# Patient Record
Sex: FEMALE | Race: WHITE | NOT HISPANIC OR LATINO | Employment: OTHER | URBAN - NONMETROPOLITAN AREA
[De-identification: names, ages, dates, MRNs, and addresses within clinical notes are randomized per-mention and may not be internally consistent; named-entity substitution may affect disease eponyms.]

---

## 2022-04-22 RX ORDER — ALENDRONATE SODIUM 70 MG/1
70 TABLET ORAL
Qty: 12 TABLET | Refills: 0 | Status: SHIPPED | OUTPATIENT
Start: 2022-04-22 | End: 2022-05-03 | Stop reason: SDUPTHER

## 2022-04-22 NOTE — TELEPHONE ENCOUNTER
Rx Refill Note  Requested Prescriptions     Pending Prescriptions Disp Refills   • alendronate (Fosamax) 70 MG tablet 12 tablet 0     Sig: Take 1 tablet by mouth Every 7 (Seven) Days for 90 days.      Last office visit with prescribing clinician: Visit date not found      Next office visit with prescribing clinician: 7/12/2022      {TIP  Is Refill Pharmacy correct?YES  Deja Garcia Rep  04/22/22, 12:17 EDT     PT IS OUT, Sunday IS HER DAY TO TAKE ONE.

## 2022-04-27 ENCOUNTER — TELEPHONE (OUTPATIENT)
Dept: FAMILY MEDICINE CLINIC | Facility: CLINIC | Age: 82
End: 2022-04-27

## 2022-04-29 RX ORDER — ALENDRONATE SODIUM 70 MG/1
TABLET ORAL
Qty: 12 TABLET | Refills: 0 | OUTPATIENT
Start: 2022-04-29

## 2022-05-02 RX ORDER — ALENDRONATE SODIUM 70 MG/1
TABLET ORAL
Qty: 12 TABLET | Refills: 0 | OUTPATIENT
Start: 2022-05-02

## 2022-05-02 NOTE — TELEPHONE ENCOUNTER
Incoming Refill Request      Medication requested (name and dose): ALENDRONATE 70MG    Pharmacy where request should be sent: [facility]    Additional details provided by patient: PT IS NO LONGER USING [facility]. SHE SWITCHED TO [facility] SO THIS MEDICATION NEEDS TO BE SENT TO [facility]. SHE SAID SHE HAD ONE REFILL LEFT ON HER PRESCRIPTION AND THAT THE PHARMACY WILL NOT REFILL IT. PT HAS AN APPT WITH DR CHAMBERLAIN TOMORROW AFTERNOON TO HOPEFULLY GET THIS FIGURED OUT. I SEE WHERE IT WAS ATTEMPTED TO BE SENT BUT IT HAS NO CONFIRMED RECEIPT BY PHARMACY. PT IS VERY UPSET THAT SHE HAS HAD TO GO THROUGH ALL THIS TROUBLE. PLEASE CALL PT AND LET HER KNOW IF THIS MEDICATION IS BEING SENT IN, THANK YOU.    Best call back number: [phone]    Does the patient have less than a 3 day supply:  [x] Yes  [] No    Deja YADAV Rep  05/02/22, 13:54 EDT

## 2022-05-03 PROBLEM — M85.80 OSTEOPENIA: Status: ACTIVE | Noted: 2022-05-03

## 2022-05-03 PROBLEM — I10 ESSENTIAL HYPERTENSION: Status: ACTIVE | Noted: 2022-05-03

## 2022-05-03 PROBLEM — I48.19 OTHER PERSISTENT ATRIAL FIBRILLATION: Status: ACTIVE | Noted: 2022-05-03

## 2022-05-03 PROBLEM — Z79.899 HIGH RISK MEDICATION USE: Status: ACTIVE | Noted: 2022-05-03

## 2022-05-03 RX ORDER — ALENDRONATE SODIUM 70 MG/1
70 TABLET ORAL
Qty: 12 TABLET | Refills: 0 | Status: SHIPPED | OUTPATIENT
Start: 2022-05-03 | End: 2022-08-01

## 2022-05-13 DIAGNOSIS — E55.9 VITAMIN D DEFICIENCY: ICD-10-CM

## 2022-05-13 DIAGNOSIS — I10 ESSENTIAL HYPERTENSION: Primary | ICD-10-CM

## 2022-05-13 DIAGNOSIS — Z79.899 HIGH RISK MEDICATION USE: ICD-10-CM

## 2022-07-05 ENCOUNTER — LAB (OUTPATIENT)
Dept: FAMILY MEDICINE CLINIC | Facility: CLINIC | Age: 82
End: 2022-07-05

## 2022-07-05 DIAGNOSIS — E55.9 VITAMIN D DEFICIENCY: ICD-10-CM

## 2022-07-05 DIAGNOSIS — Z79.899 HIGH RISK MEDICATION USE: ICD-10-CM

## 2022-07-05 DIAGNOSIS — I10 ESSENTIAL HYPERTENSION: ICD-10-CM

## 2022-07-05 DIAGNOSIS — Z11.59 ENCOUNTER FOR HCV SCREENING TEST FOR LOW RISK PATIENT: Primary | ICD-10-CM

## 2022-07-05 PROCEDURE — 36415 COLL VENOUS BLD VENIPUNCTURE: CPT | Performed by: FAMILY MEDICINE

## 2022-07-06 LAB
25(OH)D3+25(OH)D2 SERPL-MCNC: 110 NG/ML (ref 30–100)
ALBUMIN SERPL-MCNC: 4.7 G/DL (ref 3.6–4.6)
ALBUMIN/GLOB SERPL: 2.1 {RATIO} (ref 1.2–2.2)
ALP SERPL-CCNC: 76 IU/L (ref 44–121)
ALT SERPL-CCNC: 18 IU/L (ref 0–32)
AST SERPL-CCNC: 24 IU/L (ref 0–40)
BASOPHILS # BLD AUTO: 0 X10E3/UL (ref 0–0.2)
BASOPHILS NFR BLD AUTO: 1 %
BILIRUB SERPL-MCNC: 0.7 MG/DL (ref 0–1.2)
BUN SERPL-MCNC: 25 MG/DL (ref 8–27)
BUN/CREAT SERPL: 30 (ref 12–28)
CALCIUM SERPL-MCNC: 9.6 MG/DL (ref 8.7–10.3)
CHLORIDE SERPL-SCNC: 103 MMOL/L (ref 96–106)
CHOLEST SERPL-MCNC: 204 MG/DL (ref 100–199)
CK SERPL-CCNC: 58 U/L (ref 26–161)
CO2 SERPL-SCNC: 26 MMOL/L (ref 20–29)
CREAT SERPL-MCNC: 0.82 MG/DL (ref 0.57–1)
EGFRCR SERPLBLD CKD-EPI 2021: 70 ML/MIN/1.73
EOSINOPHIL # BLD AUTO: 0.1 X10E3/UL (ref 0–0.4)
EOSINOPHIL NFR BLD AUTO: 2 %
ERYTHROCYTE [DISTWIDTH] IN BLOOD BY AUTOMATED COUNT: 12.5 % (ref 11.7–15.4)
GLOBULIN SER CALC-MCNC: 2.2 G/DL (ref 1.5–4.5)
GLUCOSE SERPL-MCNC: 94 MG/DL (ref 65–99)
HCT VFR BLD AUTO: 41.2 % (ref 34–46.6)
HCV AB S/CO SERPL IA: 0.2 S/CO RATIO (ref 0–0.9)
HDLC SERPL-MCNC: 46 MG/DL
HGB BLD-MCNC: 13.7 G/DL (ref 11.1–15.9)
IMM GRANULOCYTES # BLD AUTO: 0 X10E3/UL (ref 0–0.1)
IMM GRANULOCYTES NFR BLD AUTO: 0 %
LDLC SERPL CALC-MCNC: 144 MG/DL (ref 0–99)
LYMPHOCYTES # BLD AUTO: 1.5 X10E3/UL (ref 0.7–3.1)
LYMPHOCYTES NFR BLD AUTO: 27 %
MCH RBC QN AUTO: 31.2 PG (ref 26.6–33)
MCHC RBC AUTO-ENTMCNC: 33.3 G/DL (ref 31.5–35.7)
MCV RBC AUTO: 94 FL (ref 79–97)
MONOCYTES # BLD AUTO: 0.6 X10E3/UL (ref 0.1–0.9)
MONOCYTES NFR BLD AUTO: 11 %
NEUTROPHILS # BLD AUTO: 3.4 X10E3/UL (ref 1.4–7)
NEUTROPHILS NFR BLD AUTO: 59 %
PLATELET # BLD AUTO: 230 X10E3/UL (ref 150–450)
POTASSIUM SERPL-SCNC: 4.1 MMOL/L (ref 3.5–5.2)
PROT SERPL-MCNC: 6.9 G/DL (ref 6–8.5)
RBC # BLD AUTO: 4.39 X10E6/UL (ref 3.77–5.28)
SODIUM SERPL-SCNC: 145 MMOL/L (ref 134–144)
TRIGL SERPL-MCNC: 78 MG/DL (ref 0–149)
TSH SERPL DL<=0.005 MIU/L-ACNC: 1.7 UIU/ML (ref 0.45–4.5)
VLDLC SERPL CALC-MCNC: 14 MG/DL (ref 5–40)
WBC # BLD AUTO: 5.6 X10E3/UL (ref 3.4–10.8)

## 2022-07-12 ENCOUNTER — OFFICE VISIT (OUTPATIENT)
Dept: FAMILY MEDICINE CLINIC | Facility: CLINIC | Age: 82
End: 2022-07-12

## 2022-07-12 VITALS
HEIGHT: 69 IN | TEMPERATURE: 97.8 F | HEART RATE: 100 BPM | DIASTOLIC BLOOD PRESSURE: 80 MMHG | WEIGHT: 154.6 LBS | BODY MASS INDEX: 22.9 KG/M2 | SYSTOLIC BLOOD PRESSURE: 128 MMHG | OXYGEN SATURATION: 98 % | RESPIRATION RATE: 12 BRPM

## 2022-07-12 DIAGNOSIS — E55.9 VITAMIN D DEFICIENCY, UNSPECIFIED: ICD-10-CM

## 2022-07-12 DIAGNOSIS — E78.2 HYPERLIPIDEMIA, MIXED: ICD-10-CM

## 2022-07-12 DIAGNOSIS — I10 ESSENTIAL HYPERTENSION: ICD-10-CM

## 2022-07-12 DIAGNOSIS — E55.9 VITAMIN D DEFICIENCY: ICD-10-CM

## 2022-07-12 DIAGNOSIS — I48.19 OTHER PERSISTENT ATRIAL FIBRILLATION: ICD-10-CM

## 2022-07-12 DIAGNOSIS — E03.9 ACQUIRED HYPOTHYROIDISM: Primary | ICD-10-CM

## 2022-07-12 DIAGNOSIS — Z79.899 HIGH RISK MEDICATION USE: ICD-10-CM

## 2022-07-12 PROCEDURE — 99214 OFFICE O/P EST MOD 30 MIN: CPT | Performed by: FAMILY MEDICINE

## 2022-07-12 RX ORDER — CITALOPRAM 40 MG/1
TABLET ORAL
COMMUNITY
Start: 2022-05-03 | End: 2022-08-09

## 2022-07-12 RX ORDER — MULTIPLE VITAMINS W/ MINERALS TAB 9MG-400MCG
1 TAB ORAL DAILY
COMMUNITY

## 2022-07-12 RX ORDER — LEVOTHYROXINE SODIUM 88 UG/1
TABLET ORAL
COMMUNITY
Start: 2022-06-10

## 2022-07-12 RX ORDER — RIVAROXABAN 20 MG/1
TABLET, FILM COATED ORAL
COMMUNITY
Start: 2022-07-03

## 2022-07-12 RX ORDER — PRAVASTATIN SODIUM 20 MG
20 TABLET ORAL DAILY
Qty: 90 TABLET | Refills: 1 | Status: SHIPPED | OUTPATIENT
Start: 2022-07-12 | End: 2023-01-03

## 2022-07-12 NOTE — ASSESSMENT & PLAN NOTE
HDL 46.  .  We had a long discussion about this.  Previously she has been hesitant to start cholesterol-lowering medicine.  She is agreeable to doing at this time.  We will put her on 20 mg of pravastatin as she is worried about myalgias.  Recheck in 6 months.

## 2022-07-12 NOTE — PROGRESS NOTES
Patient Name: Melissa Reddy  : 1940   MRN: [id number]     Chief Complaint:    Chief Complaint   Patient presents with   • lab results     Pt here for lab results   • Hypertension   • Atrial Fibrillation       History of Present Illness: Melissa Reddy is a 82 y.o. female who is here today for follow up.  HPI        Review of Systems:   Review of Systems   Constitutional: Negative.    HENT: Negative.    Eyes: Negative.    Respiratory: Negative.    Cardiovascular: Negative.    Gastrointestinal: Negative.    Neurological: Negative.         Past Medical History:   Past Medical History:   Diagnosis Date   • Acquired hypothyroidism    • Allergic     WASP STINGS   • AR (allergic rhinitis)    • Atrial fibrillation (HCC)    • Chronic anticoagulation    • Chronic back pain    • Chronic kidney disease     STAGE 2   • Disorder    • DJD (degenerative joint disease)     INVOLVING MULTIPLE JOINTS   • Essential hypertension    • High risk medication use    • Hypercholesterolemia    • Hypertension    • Hypothyroidism    • Long term current use of anticoagulant    • Osteoarthritis    • Osteopenia    • Other persistent atrial fibrillation (HCC) 5/3/2022   • Persistent atrial fibrillation (HCC)    • Recurrent major depression in full remission (HCC)    • Rotator cuff disorder    • Tobacco use        Past Surgical History:   Past Surgical History:   Procedure Laterality Date   • HYSTERECTOMY      TOTAL   • ROTATOR CUFF REPAIR     • TONSILLECTOMY         Family History:   Family History   Problem Relation Age of Onset   • Alcohol abuse Father    • Brain cancer Sister        Social History:   Social History     Socioeconomic History   • Marital status:    Tobacco Use   • Smoking status: Former Smoker     Packs/day: 0.25     Years: 44.00     Pack years: 11.00     Start date:      Quit date:      Years since quittin.5   • Smokeless tobacco: Never Used       Medications:     Current Outpatient  "Medications:   •  alendronate (Fosamax) 70 MG tablet, Take 1 tablet by mouth Every 7 (Seven) Days for 90 days., Disp: 12 tablet, Rfl: 0  •  citalopram (CeleXA) 40 MG tablet, , Disp: , Rfl:   •  levothyroxine (SYNTHROID, LEVOTHROID) 88 MCG tablet, , Disp: , Rfl:   •  multivitamin with minerals tablet tablet, Take 1 tablet by mouth Daily., Disp: , Rfl:   •  Xarelto 20 MG tablet, , Disp: , Rfl:   •  pravastatin (PRAVACHOL) 20 MG tablet, Take 1 tablet by mouth Daily., Disp: 90 tablet, Rfl: 1    Allergies:   Allergies   Allergen Reactions   • Wasp Venom Protein Hives and Swelling         Physical Exam:  Vital Signs:   Vitals:    07/12/22 1401   BP: 128/80   BP Location: Left arm   Patient Position: Sitting   Cuff Size: Adult   Pulse: 100   Resp: 12   Temp: 97.8 °F (36.6 °C)   TempSrc: Temporal   SpO2: 98%   Weight: 70.1 kg (154 lb 9.6 oz)   Height: 175.3 cm (69\")   PainSc: 0-No pain     Body mass index is 22.83 kg/m².     Physical Exam  Vitals and nursing note reviewed.   Constitutional:       Appearance: Normal appearance. She is normal weight.   HENT:      Head: Normocephalic and atraumatic.      Right Ear: Tympanic membrane, ear canal and external ear normal.      Left Ear: Tympanic membrane, ear canal and external ear normal.      Nose: Nose normal.      Mouth/Throat:      Mouth: Mucous membranes are dry.      Pharynx: Oropharynx is clear.   Eyes:      Extraocular Movements: Extraocular movements intact.      Conjunctiva/sclera: Conjunctivae normal.      Pupils: Pupils are equal, round, and reactive to light.   Cardiovascular:      Rate and Rhythm: Normal rate and regular rhythm.      Pulses: Normal pulses.      Heart sounds: Normal heart sounds.   Pulmonary:      Effort: Pulmonary effort is normal.      Breath sounds: Normal breath sounds.   Musculoskeletal:      Cervical back: Normal range of motion and neck supple.   Feet:      Comments:      Neurological:      Mental Status: She is alert. "         Procedures      Assessment/Plan:   Diagnoses and all orders for this visit:    1. Acquired hypothyroidism (Primary)  Assessment & Plan:  TSH 1.70.  Recheck in 6 months.    Orders:  -     Vitamin D 25 Hydroxy; Future  -     Lipid Panel; Future  -     Comprehensive Metabolic Panel; Future  -     TSH Rfx On Abnormal To Free T4; Future  -     CBC & Differential; Future    2. Essential hypertension  Assessment & Plan:  Discussed with patient to monitor their blood pressure and if systolic blood pressure goes above 140 or diastolic is above 90 to return to clinic.  Take medicines as directed, call for any problems, patient not having or any worrisome symptoms.        Orders:  -     Vitamin D 25 Hydroxy; Future  -     Lipid Panel; Future  -     Comprehensive Metabolic Panel; Future  -     TSH Rfx On Abnormal To Free T4; Future  -     CBC & Differential; Future    3. Other persistent atrial fibrillation (HCC)  Assessment & Plan:  Patient on Xarelto.  Doing well.  We will continue to follow.    Orders:  -     Vitamin D 25 Hydroxy; Future  -     Lipid Panel; Future  -     Comprehensive Metabolic Panel; Future  -     TSH Rfx On Abnormal To Free T4; Future  -     CBC & Differential; Future    4. High risk medication use  Assessment & Plan:  Blood work okay.    Orders:  -     Vitamin D 25 Hydroxy; Future  -     Lipid Panel; Future  -     Comprehensive Metabolic Panel; Future  -     TSH Rfx On Abnormal To Free T4; Future  -     CBC & Differential; Future    5. Hyperlipidemia, mixed  Assessment & Plan:  HDL 46.  .  We had a long discussion about this.  Previously she has been hesitant to start cholesterol-lowering medicine.  She is agreeable to doing at this time.  We will put her on 20 mg of pravastatin as she is worried about myalgias.  Recheck in 6 months.    Orders:  -     Vitamin D 25 Hydroxy; Future  -     Lipid Panel; Future  -     Comprehensive Metabolic Panel; Future  -     TSH Rfx On Abnormal To Free T4;  Future  -     CBC & Differential; Future    6. Vitamin D deficiency    7. Vitamin D deficiency, unspecified   -     Vitamin D 25 Hydroxy; Future    Other orders  -     pravastatin (PRAVACHOL) 20 MG tablet; Take 1 tablet by mouth Daily.  Dispense: 90 tablet; Refill: 1           Follow Up:   Return in about 6 months (around 1/12/2023) for Bloodwork 1 week prior to next appointment.    Jeremi Aiken MD  [facility]

## 2022-08-02 ENCOUNTER — TELEPHONE (OUTPATIENT)
Dept: FAMILY MEDICINE CLINIC | Facility: CLINIC | Age: 82
End: 2022-08-02

## 2022-08-02 NOTE — TELEPHONE ENCOUNTER
Patient called to let Dr. Aiken know that she had to stop taking Pravastatin.  After about four or five days of taking it, she developed hives, severe itching, and swollen ankles and feet.  Since she has stopped taking it, everything is fine.    Please advise patient.

## 2022-08-04 NOTE — TELEPHONE ENCOUNTER
Agree with this if she had a reaction to the medication discontinuing follow-up with Dr. Aiken when he returns to discuss other options.

## 2022-08-04 NOTE — TELEPHONE ENCOUNTER
"LVM to return call at office #[phone].    FOR HUB: Please relay message, \"if she had a reaction to the medication discontinuing follow-up with Dr. Aiken when he returns to discuss other options.\" and schedule follow up with Dr Aiken regarding medications  "

## 2022-08-04 NOTE — TELEPHONE ENCOUNTER
HUB RELAYED MESSAGE- APPOINTMENT IS SCHEDULED- PATIENT NEEDS LATE IN THE DAY. HAS BEEN PLACED ON THE WAITING LIST.

## 2022-08-05 NOTE — TELEPHONE ENCOUNTER
PATIENT WANTED ME TO CANCEL THEIR APPOINTMENT AND DOES THE PROVIDER WANT PATIENT TO TAKE THE PRESCRIPTION PROVIDED. PATIENT WANTS TO KNOW IF PROVIDER WANTS TO TAKE SOMETHING ELSE FOR THE SYMPTOMS.

## 2022-08-07 DIAGNOSIS — F33.1 MODERATE EPISODE OF RECURRENT MAJOR DEPRESSIVE DISORDER: Primary | ICD-10-CM

## 2022-08-09 RX ORDER — CITALOPRAM 40 MG/1
TABLET ORAL
Qty: 90 TABLET | Refills: 0 | Status: SHIPPED | OUTPATIENT
Start: 2022-08-09 | End: 2022-11-11

## 2022-11-11 DIAGNOSIS — F33.1 MODERATE EPISODE OF RECURRENT MAJOR DEPRESSIVE DISORDER: ICD-10-CM

## 2022-11-11 RX ORDER — CITALOPRAM 40 MG/1
TABLET ORAL
Qty: 90 TABLET | Refills: 0 | Status: SHIPPED | OUTPATIENT
Start: 2022-11-11 | End: 2023-01-03

## 2022-11-11 NOTE — TELEPHONE ENCOUNTER
Rx Refill Note    Requested Prescriptions     Pending Prescriptions Disp Refills   • citalopram (CeleXA) 40 MG tablet [Pharmacy Med Name: CITALOPRAM HBR 40 MG TABLET] 90 tablet 0     Sig: TAKE ONE TABLET BY MOUTH DAILY        Last office visit with prescribing clinician: 7/12/2022      Next office visit with prescribing clinician: 1/13/2023   Last labs:   Last refill: 08/09/2022   Pharmacy (be sure to add in Epic). correct

## 2022-12-07 ENCOUNTER — OFFICE VISIT (OUTPATIENT)
Dept: FAMILY MEDICINE CLINIC | Facility: CLINIC | Age: 82
End: 2022-12-07

## 2022-12-07 VITALS
HEIGHT: 66 IN | RESPIRATION RATE: 15 BRPM | DIASTOLIC BLOOD PRESSURE: 86 MMHG | TEMPERATURE: 98 F | BODY MASS INDEX: 24.75 KG/M2 | WEIGHT: 154 LBS | OXYGEN SATURATION: 98 % | HEART RATE: 111 BPM | SYSTOLIC BLOOD PRESSURE: 142 MMHG

## 2022-12-07 DIAGNOSIS — J06.9 URI WITH COUGH AND CONGESTION: Primary | ICD-10-CM

## 2022-12-07 LAB
EXPIRATION DATE: NORMAL
FLUAV AG UPPER RESP QL IA.RAPID: NOT DETECTED
FLUBV AG UPPER RESP QL IA.RAPID: NOT DETECTED
INTERNAL CONTROL: NORMAL
Lab: NORMAL
SARS-COV-2 AG UPPER RESP QL IA.RAPID: NOT DETECTED

## 2022-12-07 PROCEDURE — 99213 OFFICE O/P EST LOW 20 MIN: CPT | Performed by: PHYSICIAN ASSISTANT

## 2022-12-07 PROCEDURE — 87428 SARSCOV & INF VIR A&B AG IA: CPT | Performed by: PHYSICIAN ASSISTANT

## 2022-12-07 RX ORDER — IPRATROPIUM BROMIDE 42 UG/1
2 SPRAY, METERED NASAL 4 TIMES DAILY PRN
Qty: 15 ML | Refills: 1 | Status: SHIPPED | OUTPATIENT
Start: 2022-12-07 | End: 2023-01-03

## 2022-12-07 RX ORDER — BISOPROLOL FUMARATE 5 MG/1
TABLET, FILM COATED ORAL
COMMUNITY
Start: 2022-11-21

## 2022-12-07 NOTE — PROGRESS NOTES
Patient Office Visit      Patient Name: Melissa Reddy  : 1940   MRN: [id number]     Chief Complaint:    Chief Complaint   Patient presents with   • URI     X 3 days       History of Present Illness: Melissa Reddy is a 82 y.o. female who is here today for cold symptoms that started about 3 days ago.  She has had a very runny nose and a little bit of a sore throat and a minimal cough.  She has not had fever and she has had her COVID and flu vaccinations.  She is feeling better and almost canceled the appointment.    Subjective      Review of Systems:   Review of Systems   Constitutional: Negative for chills and fever.   HENT: Positive for rhinorrhea and sore throat. Negative for ear pain.    Respiratory: Positive for shortness of breath ( 1 episode of shortness of breath a couple of days ago when she bent over that only lasted for a couple of minutes and she has had no further episodes.) and wheezing.    Cardiovascular: Negative for chest pain and leg swelling.   Gastrointestinal: Negative for abdominal pain and vomiting.   Musculoskeletal: Negative for neck pain.        Past Medical History:   Past Medical History:   Diagnosis Date   • Acquired hypothyroidism    • Allergic     WASP STINGS   • AR (allergic rhinitis)    • Atrial fibrillation (HCC)    • Chronic anticoagulation    • Chronic back pain    • Chronic kidney disease     STAGE 2   • Disorder    • DJD (degenerative joint disease)     INVOLVING MULTIPLE JOINTS   • Essential hypertension    • High risk medication use    • Hypercholesterolemia    • Hypertension    • Hypothyroidism    • Long term current use of anticoagulant    • Osteoarthritis    • Osteopenia    • Other persistent atrial fibrillation (HCC) 5/3/2022   • Persistent atrial fibrillation (HCC)    • Recurrent major depression in full remission (HCC)    • Rotator cuff disorder    • Tobacco use        Past Surgical History:   Past Surgical History:   Procedure Laterality Date   •  "HYSTERECTOMY      TOTAL   • ROTATOR CUFF REPAIR     • TONSILLECTOMY         Family History:   Family History   Problem Relation Age of Onset   • Alcohol abuse Father    • Brain cancer Sister        Social History:   Social History     Socioeconomic History   • Marital status:    Tobacco Use   • Smoking status: Former     Packs/day: 0.25     Years: 44.00     Pack years: 11.00     Types: Cigarettes     Start date:      Quit date: 2000     Years since quittin.9   • Smokeless tobacco: Never       Allergies:   Allergies   Allergen Reactions   • Wasp Venom Protein Hives and Swelling       Objective     Physical Exam:  Vital Signs:   Vitals:    22 1519 22 1756   BP: 142/86    BP Location: Right arm    Patient Position: Sitting    Cuff Size: Large Adult    Pulse: 111    Resp: 15    Temp: 98 °F (36.7 °C)    TempSrc: Temporal    SpO2: 93% 98%   Weight: 69.9 kg (154 lb)    Height: 167.6 cm (66\")      Body mass index is 24.86 kg/m².   BMI is within normal parameters. No other follow-up for BMI required.       Physical Exam  Constitutional:       Appearance: She is normal weight.   Cardiovascular:      Rate and Rhythm: Normal rate. Rhythm irregular.   Pulmonary:      Effort: Pulmonary effort is normal.      Breath sounds: Normal breath sounds.   Neurological:      General: No focal deficit present.   Psychiatric:         Thought Content: Thought content normal.         Judgment: Judgment normal.         Procedures    Assessment / Plan      Assessment/Plan:   Diagnoses and all orders for this visit:    1. URI with cough and congestion (Primary)  -     POCT SARS-CoV-2 Antigen CHANEL + Flu  -     ipratropium (ATROVENT) 0.06 % nasal spray; 2 sprays into the nostril(s) as directed by provider 4 (Four) Times a Day As Needed for Rhinitis.  Dispense: 15 mL; Refill: 1    Negative rapid COVID and flu.  I think this is a viral upper respiratory infection.  She is already starting to feel better.  Her runny nose " is bothering her the most and she is willing to try Atrovent nasal spray.    Medications:     Current Outpatient Medications:   •  bisoprolol (ZEBeta) 5 MG tablet, , Disp: , Rfl:   •  citalopram (CeleXA) 40 MG tablet, TAKE ONE TABLET BY MOUTH DAILY, Disp: 90 tablet, Rfl: 0  •  levothyroxine (SYNTHROID, LEVOTHROID) 88 MCG tablet, , Disp: , Rfl:   •  multivitamin with minerals tablet tablet, Take 1 tablet by mouth Daily., Disp: , Rfl:   •  pravastatin (PRAVACHOL) 20 MG tablet, Take 1 tablet by mouth Daily., Disp: 90 tablet, Rfl: 1  •  Xarelto 20 MG tablet, , Disp: , Rfl:   •  ipratropium (ATROVENT) 0.06 % nasal spray, 2 sprays into the nostril(s) as directed by provider 4 (Four) Times a Day As Needed for Rhinitis., Disp: 15 mL, Rfl: 1        Follow Up:   No follow-ups on file.    Chiquis Alvares PA-C   [facility]

## 2023-01-03 DIAGNOSIS — J06.9 URI WITH COUGH AND CONGESTION: ICD-10-CM

## 2023-01-03 DIAGNOSIS — F33.1 MODERATE EPISODE OF RECURRENT MAJOR DEPRESSIVE DISORDER: ICD-10-CM

## 2023-01-03 RX ORDER — PRAVASTATIN SODIUM 20 MG
TABLET ORAL
Qty: 90 TABLET | Refills: 1 | Status: SHIPPED | OUTPATIENT
Start: 2023-01-03

## 2023-01-03 RX ORDER — CITALOPRAM 40 MG/1
TABLET ORAL
Qty: 90 TABLET | Refills: 1 | Status: SHIPPED | OUTPATIENT
Start: 2023-01-03

## 2023-01-03 RX ORDER — IPRATROPIUM BROMIDE 42 UG/1
SPRAY, METERED NASAL
Qty: 15 ML | Refills: 1 | Status: SHIPPED | OUTPATIENT
Start: 2023-01-03

## 2023-01-03 NOTE — TELEPHONE ENCOUNTER
Rx Refill Note    Requested Prescriptions     Pending Prescriptions Disp Refills   • pravastatin (PRAVACHOL) 20 MG tablet [Pharmacy Med Name: PRAVASTATIN SODIUM 20 MG TAB] 90 tablet 1     Sig: TAKE ONE TABLET BY MOUTH DAILY   • citalopram (CeleXA) 40 MG tablet [Pharmacy Med Name: CITALOPRAM HBR 40 MG TABLET] 90 tablet 0     Sig: TAKE ONE TABLET BY MOUTH DAILY        Last office visit with prescribing clinician: 7/12/2022      Next office visit with prescribing clinician: 1/13/2023   Last labs:   Last refill:    Pharmacy [facility]

## 2023-01-06 ENCOUNTER — LAB (OUTPATIENT)
Dept: FAMILY MEDICINE CLINIC | Facility: CLINIC | Age: 83
End: 2023-01-06
Payer: MEDICARE

## 2023-01-06 DIAGNOSIS — E55.9 VITAMIN D DEFICIENCY, UNSPECIFIED: ICD-10-CM

## 2023-01-06 DIAGNOSIS — I10 ESSENTIAL HYPERTENSION: ICD-10-CM

## 2023-01-06 DIAGNOSIS — E03.9 ACQUIRED HYPOTHYROIDISM: ICD-10-CM

## 2023-01-06 DIAGNOSIS — E78.2 HYPERLIPIDEMIA, MIXED: ICD-10-CM

## 2023-01-06 DIAGNOSIS — I48.19 OTHER PERSISTENT ATRIAL FIBRILLATION: ICD-10-CM

## 2023-01-06 DIAGNOSIS — Z79.899 HIGH RISK MEDICATION USE: ICD-10-CM

## 2023-01-06 PROCEDURE — 36415 COLL VENOUS BLD VENIPUNCTURE: CPT | Performed by: FAMILY MEDICINE

## 2023-01-07 LAB
25(OH)D3+25(OH)D2 SERPL-MCNC: 92 NG/ML (ref 30–100)
ALBUMIN SERPL-MCNC: 4.5 G/DL (ref 3.6–4.6)
ALBUMIN/GLOB SERPL: 2.1 {RATIO} (ref 1.2–2.2)
ALP SERPL-CCNC: 85 IU/L (ref 44–121)
ALT SERPL-CCNC: 18 IU/L (ref 0–32)
AST SERPL-CCNC: 24 IU/L (ref 0–40)
BASOPHILS # BLD AUTO: 0 X10E3/UL (ref 0–0.2)
BASOPHILS NFR BLD AUTO: 1 %
BILIRUB SERPL-MCNC: 0.7 MG/DL (ref 0–1.2)
BUN SERPL-MCNC: 12 MG/DL (ref 8–27)
BUN/CREAT SERPL: 14 (ref 12–28)
CALCIUM SERPL-MCNC: 10.4 MG/DL (ref 8.7–10.3)
CHLORIDE SERPL-SCNC: 102 MMOL/L (ref 96–106)
CHOLEST SERPL-MCNC: 163 MG/DL (ref 100–199)
CO2 SERPL-SCNC: 30 MMOL/L (ref 20–29)
CREAT SERPL-MCNC: 0.83 MG/DL (ref 0.57–1)
EGFRCR SERPLBLD CKD-EPI 2021: 69 ML/MIN/1.73
EOSINOPHIL # BLD AUTO: 0.2 X10E3/UL (ref 0–0.4)
EOSINOPHIL NFR BLD AUTO: 3 %
ERYTHROCYTE [DISTWIDTH] IN BLOOD BY AUTOMATED COUNT: 13.4 % (ref 11.7–15.4)
GLOBULIN SER CALC-MCNC: 2.1 G/DL (ref 1.5–4.5)
GLUCOSE SERPL-MCNC: 91 MG/DL (ref 70–99)
HCT VFR BLD AUTO: 43.1 % (ref 34–46.6)
HDLC SERPL-MCNC: 42 MG/DL
HGB BLD-MCNC: 14.1 G/DL (ref 11.1–15.9)
IMM GRANULOCYTES # BLD AUTO: 0 X10E3/UL (ref 0–0.1)
IMM GRANULOCYTES NFR BLD AUTO: 0 %
LDLC SERPL CALC-MCNC: 108 MG/DL (ref 0–99)
LYMPHOCYTES # BLD AUTO: 1.6 X10E3/UL (ref 0.7–3.1)
LYMPHOCYTES NFR BLD AUTO: 28 %
MCH RBC QN AUTO: 30.5 PG (ref 26.6–33)
MCHC RBC AUTO-ENTMCNC: 32.7 G/DL (ref 31.5–35.7)
MCV RBC AUTO: 93 FL (ref 79–97)
MONOCYTES # BLD AUTO: 0.8 X10E3/UL (ref 0.1–0.9)
MONOCYTES NFR BLD AUTO: 14 %
NEUTROPHILS # BLD AUTO: 3 X10E3/UL (ref 1.4–7)
NEUTROPHILS NFR BLD AUTO: 54 %
PLATELET # BLD AUTO: 248 X10E3/UL (ref 150–450)
POTASSIUM SERPL-SCNC: 4.4 MMOL/L (ref 3.5–5.2)
PROT SERPL-MCNC: 6.6 G/DL (ref 6–8.5)
RBC # BLD AUTO: 4.62 X10E6/UL (ref 3.77–5.28)
SODIUM SERPL-SCNC: 144 MMOL/L (ref 134–144)
TRIGL SERPL-MCNC: 69 MG/DL (ref 0–149)
TSH SERPL DL<=0.005 MIU/L-ACNC: 2.97 UIU/ML (ref 0.45–4.5)
VLDLC SERPL CALC-MCNC: 13 MG/DL (ref 5–40)
WBC # BLD AUTO: 5.6 X10E3/UL (ref 3.4–10.8)

## 2023-01-25 ENCOUNTER — OFFICE VISIT (OUTPATIENT)
Dept: FAMILY MEDICINE CLINIC | Facility: CLINIC | Age: 83
End: 2023-01-25
Payer: MEDICARE

## 2023-01-25 VITALS
HEIGHT: 66 IN | TEMPERATURE: 98 F | OXYGEN SATURATION: 97 % | WEIGHT: 154 LBS | DIASTOLIC BLOOD PRESSURE: 80 MMHG | BODY MASS INDEX: 24.75 KG/M2 | RESPIRATION RATE: 14 BRPM | HEART RATE: 78 BPM | SYSTOLIC BLOOD PRESSURE: 118 MMHG

## 2023-01-25 DIAGNOSIS — E55.9 VITAMIN D DEFICIENCY: ICD-10-CM

## 2023-01-25 DIAGNOSIS — I10 ESSENTIAL HYPERTENSION: ICD-10-CM

## 2023-01-25 DIAGNOSIS — E03.9 ACQUIRED HYPOTHYROIDISM: ICD-10-CM

## 2023-01-25 DIAGNOSIS — E83.52 HYPERCALCEMIA: Primary | ICD-10-CM

## 2023-01-25 DIAGNOSIS — E78.2 HYPERLIPIDEMIA, MIXED: ICD-10-CM

## 2023-01-25 PROBLEM — J30.9 ALLERGIC RHINITIS: Status: ACTIVE | Noted: 2023-01-25

## 2023-01-25 PROBLEM — F33.42 RECURRENT MAJOR DEPRESSION IN FULL REMISSION: Status: ACTIVE | Noted: 2023-01-25

## 2023-01-25 PROBLEM — M15.0 PRIMARY OSTEOARTHRITIS INVOLVING MULTIPLE JOINTS: Status: ACTIVE | Noted: 2023-01-25

## 2023-01-25 PROBLEM — N18.2 CKD (CHRONIC KIDNEY DISEASE) STAGE 2, GFR 60-89 ML/MIN: Status: ACTIVE | Noted: 2023-01-25

## 2023-01-25 PROBLEM — M15.9 PRIMARY OSTEOARTHRITIS INVOLVING MULTIPLE JOINTS: Status: ACTIVE | Noted: 2023-01-25

## 2023-01-25 PROBLEM — Z79.01 CHRONIC ANTICOAGULATION: Status: ACTIVE | Noted: 2023-01-25

## 2023-01-25 PROBLEM — Z91.038 HISTORY OF ANAPHYLACTIC SHOCK DUE TO INSECT STING: Status: ACTIVE | Noted: 2023-01-25

## 2023-01-25 PROCEDURE — 36415 COLL VENOUS BLD VENIPUNCTURE: CPT | Performed by: FAMILY MEDICINE

## 2023-01-25 PROCEDURE — 99214 OFFICE O/P EST MOD 30 MIN: CPT | Performed by: FAMILY MEDICINE

## 2023-01-25 RX ORDER — ALENDRONATE SODIUM 70 MG/1
TABLET ORAL
COMMUNITY
Start: 2022-12-26 | End: 2023-03-20

## 2023-01-25 NOTE — ASSESSMENT & PLAN NOTE
Calcium was 10.4.  Patient is asymptomatic.  I am going to order a repeat CMP, ionized calcium, and PTH.  Return to clinic in 1 week.

## 2023-01-25 NOTE — PROGRESS NOTES
Patient Name: Melissa Reddy  : 1940   MRN: [id number]     Chief Complaint:    Chief Complaint   Patient presents with   • lab results     Pt here for lab results   • Hypertension   • Hyperlipidemia       History of Present Illness: Melissa Reddy is a 82 y.o. female who is here today for follow up on blood work  HPI        Review of Systems:   Review of Systems   Constitutional: Negative.    HENT: Negative.    Eyes: Negative.    Respiratory: Negative.    Cardiovascular: Negative.    Gastrointestinal: Negative.    Neurological: Negative.         Past Medical History:   Past Medical History:   Diagnosis Date   • Acquired hypothyroidism    • Allergic     WASP STINGS   • AR (allergic rhinitis)    • Atrial fibrillation (HCC)    • Chronic anticoagulation    • Chronic back pain    • Chronic kidney disease     STAGE 2   • Disorder    • DJD (degenerative joint disease)     INVOLVING MULTIPLE JOINTS   • Essential hypertension    • High risk medication use    • Hypercholesterolemia    • Hypertension    • Hypothyroidism    • Long term current use of anticoagulant    • Osteoarthritis    • Osteopenia    • Other persistent atrial fibrillation (HCC) 5/3/2022   • Persistent atrial fibrillation (HCC)    • Recurrent major depression in full remission (HCC)    • Rotator cuff disorder    • Tobacco use        Past Surgical History:   Past Surgical History:   Procedure Laterality Date   • HYSTERECTOMY      TOTAL   • ROTATOR CUFF REPAIR     • TONSILLECTOMY         Family History:   Family History   Problem Relation Age of Onset   • Alcohol abuse Father    • Brain cancer Sister        Social History:   Social History     Socioeconomic History   • Marital status:    Tobacco Use   • Smoking status: Former     Packs/day: 0.25     Years: 44.00     Pack years: 11.00     Types: Cigarettes     Start date:      Quit date: 2000     Years since quittin.0   • Smokeless tobacco: Never       Medications:  "    Current Outpatient Medications:   •  alendronate (FOSAMAX) 70 MG tablet, , Disp: , Rfl:   •  bisoprolol (ZEBeta) 5 MG tablet, , Disp: , Rfl:   •  citalopram (CeleXA) 40 MG tablet, TAKE ONE TABLET BY MOUTH DAILY, Disp: 90 tablet, Rfl: 1  •  ipratropium (ATROVENT) 0.06 % nasal spray, SPRAY TWO SPRAYS IN THE AFFECTED NOSTRIL FOUR TIMES A DAY AS DIRECTED AS NEEDED FOR RHINITIS, Disp: 15 mL, Rfl: 1  •  levothyroxine (SYNTHROID, LEVOTHROID) 88 MCG tablet, , Disp: , Rfl:   •  Magnesium Citrate 200 MG tablet, , Disp: , Rfl:   •  multivitamin with minerals tablet tablet, Take 1 tablet by mouth Daily., Disp: , Rfl:   •  pravastatin (PRAVACHOL) 20 MG tablet, TAKE ONE TABLET BY MOUTH DAILY, Disp: 90 tablet, Rfl: 1  •  Xarelto 20 MG tablet, , Disp: , Rfl:     Allergies:   Allergies   Allergen Reactions   • Wasp Venom Protein Hives and Swelling         Physical Exam:  Vital Signs:   Vitals:    01/25/23 1326   BP: 118/80   BP Location: Left arm   Patient Position: Sitting   Cuff Size: Adult   Pulse: 78   Resp: 14   Temp: 98 °F (36.7 °C)   TempSrc: Temporal   SpO2: 97%   Weight: 69.9 kg (154 lb)   Height: 167.6 cm (66\")   PainSc: 0-No pain     Body mass index is 24.86 kg/m².     Physical Exam  Vitals and nursing note reviewed.   Constitutional:       Appearance: Normal appearance. She is normal weight.   HENT:      Head: Normocephalic and atraumatic.      Right Ear: Tympanic membrane, ear canal and external ear normal.      Left Ear: Tympanic membrane, ear canal and external ear normal.      Nose: Nose normal.      Mouth/Throat:      Mouth: Mucous membranes are dry.      Pharynx: Oropharynx is clear.   Eyes:      Extraocular Movements: Extraocular movements intact.      Conjunctiva/sclera: Conjunctivae normal.      Pupils: Pupils are equal, round, and reactive to light.   Cardiovascular:      Rate and Rhythm: Normal rate and regular rhythm.      Pulses: Normal pulses.      Heart sounds: Normal heart sounds.   Pulmonary:      " Effort: Pulmonary effort is normal.      Breath sounds: Normal breath sounds.   Musculoskeletal:      Cervical back: Normal range of motion and neck supple.   Feet:      Comments:      Neurological:      Mental Status: She is alert.         Procedures      Assessment/Plan:   Diagnoses and all orders for this visit:    1. Hypercalcemia (Primary)  Assessment & Plan:  Calcium was 10.4.  Patient is asymptomatic.  I am going to order a repeat CMP, ionized calcium, and PTH.  Return to clinic in 1 week.    Orders:  -     Calcium, Ionized; Future  -     Comprehensive Metabolic Panel; Future  -     PTH, Intact; Future    2. Hyperlipidemia, mixed  Assessment & Plan:  HDL 42.  .  Triglycerides 69.  We will follow.      3. Essential hypertension  Assessment & Plan:  Discussed with patient to monitor their blood pressure and if systolic blood pressure goes above 140 or diastolic is above 90 to return to clinic.  Take medicines as directed, call for any problems, patient not having or any worrisome symptoms.          4. Vitamin D deficiency  Assessment & Plan:  We will follow.      5. Acquired hypothyroidism  Assessment & Plan:  TSH is 2.970.  We will follow.             Follow Up:   Return in about 2 weeks (around 2/8/2023) for Annual physical.    Jeremi Aiken MD  [facility]

## 2023-01-26 LAB
ALBUMIN SERPL-MCNC: 4.8 G/DL (ref 3.6–4.6)
ALBUMIN/GLOB SERPL: 2.5 {RATIO} (ref 1.2–2.2)
ALP SERPL-CCNC: 95 IU/L (ref 44–121)
ALT SERPL-CCNC: 20 IU/L (ref 0–32)
AST SERPL-CCNC: 25 IU/L (ref 0–40)
BILIRUB SERPL-MCNC: 0.8 MG/DL (ref 0–1.2)
BUN SERPL-MCNC: 13 MG/DL (ref 8–27)
BUN/CREAT SERPL: 17 (ref 12–28)
CA-I SERPL ISE-MCNC: 5.6 MG/DL (ref 4.5–5.6)
CALCIUM SERPL-MCNC: 9.8 MG/DL (ref 8.7–10.3)
CHLORIDE SERPL-SCNC: 100 MMOL/L (ref 96–106)
CO2 SERPL-SCNC: 25 MMOL/L (ref 20–29)
CREAT SERPL-MCNC: 0.75 MG/DL (ref 0.57–1)
EGFRCR SERPLBLD CKD-EPI 2021: 78 ML/MIN/1.73
GLOBULIN SER CALC-MCNC: 1.9 G/DL (ref 1.5–4.5)
GLUCOSE SERPL-MCNC: 92 MG/DL (ref 70–99)
POTASSIUM SERPL-SCNC: 3.9 MMOL/L (ref 3.5–5.2)
PROT SERPL-MCNC: 6.7 G/DL (ref 6–8.5)
PTH-INTACT SERPL-MCNC: 19 PG/ML (ref 15–65)
SODIUM SERPL-SCNC: 141 MMOL/L (ref 134–144)

## 2023-02-02 ENCOUNTER — OFFICE VISIT (OUTPATIENT)
Dept: FAMILY MEDICINE CLINIC | Facility: CLINIC | Age: 83
End: 2023-02-02
Payer: MEDICARE

## 2023-02-02 VITALS
HEIGHT: 69 IN | WEIGHT: 145 LBS | HEART RATE: 80 BPM | BODY MASS INDEX: 21.48 KG/M2 | DIASTOLIC BLOOD PRESSURE: 60 MMHG | OXYGEN SATURATION: 90 % | SYSTOLIC BLOOD PRESSURE: 100 MMHG

## 2023-02-02 DIAGNOSIS — R06.00 DYSPNEA, UNSPECIFIED TYPE: ICD-10-CM

## 2023-02-02 DIAGNOSIS — I48.19 OTHER PERSISTENT ATRIAL FIBRILLATION: Primary | ICD-10-CM

## 2023-02-02 DIAGNOSIS — E83.52 HYPERCALCEMIA: ICD-10-CM

## 2023-02-02 DIAGNOSIS — R13.10 DYSPHAGIA, UNSPECIFIED TYPE: ICD-10-CM

## 2023-02-02 PROCEDURE — 99214 OFFICE O/P EST MOD 30 MIN: CPT | Performed by: FAMILY MEDICINE

## 2023-02-02 PROCEDURE — 93000 ELECTROCARDIOGRAM COMPLETE: CPT | Performed by: FAMILY MEDICINE

## 2023-02-02 NOTE — ASSESSMENT & PLAN NOTE
Patient has had some trouble with food going down her esophagus.  We are going to get a upper GI.  Return to clinic in 3 weeks.

## 2023-02-02 NOTE — ASSESSMENT & PLAN NOTE
Had an episode of shortness of breath couple nights ago that lasted 2 or 3 minutes.  Her heart rate has been elevated.  Dr. Wren had cut her bisoprolol in half down to 2.5 mg.  I am going to increase her up to 5 mg and have her get another appointment see Dr. Wren.  I think her shortness of breath could be related to her elevated heart rate.  We can see if we can get rate control and see if this improves.  I told her if she gets short of breath again or if she has chest pain go to emergency room.  Continue her Xarelto.

## 2023-02-02 NOTE — ASSESSMENT & PLAN NOTE
Repeat CMP showed a normal calcium.  Ionized calcium was normal at 5.6.  PTH was normal at 19.  We are going to check her calcium studies in 3 months.

## 2023-02-02 NOTE — PROGRESS NOTES
Patient Name: Melissa Reddy  : 1940   MRN: [id number]     Chief Complaint:    Chief Complaint   Patient presents with   • follow up on blood work       History of Present Illness: Melissa Reddy is a 82 y.o. female who is here today for follow up on calcium  HPI        Review of Systems:   Review of Systems   Constitutional: Negative.    HENT: Negative.    Eyes: Negative.    Respiratory: Negative.    Cardiovascular: Negative.    Gastrointestinal: Negative.    Neurological: Negative.         Past Medical History:   Past Medical History:   Diagnosis Date   • Acquired hypothyroidism    • Allergic     WASP STINGS   • AR (allergic rhinitis)    • Atrial fibrillation (HCC)    • Chronic anticoagulation    • Chronic back pain    • Chronic kidney disease     STAGE 2   • Disorder    • DJD (degenerative joint disease)     INVOLVING MULTIPLE JOINTS   • Essential hypertension    • High risk medication use    • Hypercholesterolemia    • Hypertension    • Hypothyroidism    • Long term current use of anticoagulant    • Osteoarthritis    • Osteopenia    • Other persistent atrial fibrillation (HCC) 5/3/2022   • Persistent atrial fibrillation (HCC)    • Recurrent major depression in full remission (HCC)    • Rotator cuff disorder    • Tobacco use        Past Surgical History:   Past Surgical History:   Procedure Laterality Date   • HYSTERECTOMY      TOTAL   • ROTATOR CUFF REPAIR     • TONSILLECTOMY         Family History:   Family History   Problem Relation Age of Onset   • Alcohol abuse Father    • Brain cancer Sister        Social History:   Social History     Socioeconomic History   • Marital status:    Tobacco Use   • Smoking status: Former     Packs/day: 0.25     Years: 44.00     Pack years: 11.00     Types: Cigarettes     Start date:      Quit date:      Years since quittin.1   • Smokeless tobacco: Never   Vaping Use   • Vaping Use: Never used   Substance and Sexual Activity   • Alcohol  "use: Yes   • Drug use: Never       Medications:     Current Outpatient Medications:   •  alendronate (FOSAMAX) 70 MG tablet, , Disp: , Rfl:   •  bisoprolol (ZEBeta) 5 MG tablet, , Disp: , Rfl:   •  citalopram (CeleXA) 40 MG tablet, TAKE ONE TABLET BY MOUTH DAILY, Disp: 90 tablet, Rfl: 1  •  ipratropium (ATROVENT) 0.06 % nasal spray, SPRAY TWO SPRAYS IN THE AFFECTED NOSTRIL FOUR TIMES A DAY AS DIRECTED AS NEEDED FOR RHINITIS, Disp: 15 mL, Rfl: 1  •  levothyroxine (SYNTHROID, LEVOTHROID) 88 MCG tablet, , Disp: , Rfl:   •  Magnesium Citrate 200 MG tablet, , Disp: , Rfl:   •  multivitamin with minerals tablet tablet, Take 1 tablet by mouth Daily., Disp: , Rfl:   •  pravastatin (PRAVACHOL) 20 MG tablet, TAKE ONE TABLET BY MOUTH DAILY, Disp: 90 tablet, Rfl: 1  •  Xarelto 20 MG tablet, , Disp: , Rfl:     Allergies:   Allergies   Allergen Reactions   • Wasp Venom Protein Hives and Swelling         Physical Exam:  Vital Signs:   Vitals:    02/02/23 1432   BP: 100/60   BP Location: Right arm   Patient Position: Sitting   Cuff Size: Adult   Pulse: 80   SpO2: 90%   Weight: 65.8 kg (145 lb)   Height: 175.3 cm (69\")   PainSc: 0-No pain     Body mass index is 21.41 kg/m².     Physical Exam  Vitals and nursing note reviewed.   Constitutional:       Appearance: Normal appearance. She is normal weight.   HENT:      Head: Normocephalic and atraumatic.      Right Ear: Tympanic membrane, ear canal and external ear normal.      Left Ear: Tympanic membrane, ear canal and external ear normal.      Nose: Nose normal.      Mouth/Throat:      Mouth: Mucous membranes are dry.      Pharynx: Oropharynx is clear.   Eyes:      Extraocular Movements: Extraocular movements intact.      Conjunctiva/sclera: Conjunctivae normal.      Pupils: Pupils are equal, round, and reactive to light.   Cardiovascular:      Rate and Rhythm: Normal rate and regular rhythm.      Pulses: Normal pulses.      Heart sounds: Normal heart sounds.   Pulmonary:      Effort: " Pulmonary effort is normal.      Breath sounds: Normal breath sounds.   Musculoskeletal:      Cervical back: Normal range of motion and neck supple.   Feet:      Comments:      Neurological:      Mental Status: She is alert.           ECG 12 Lead    Date/Time: 2/2/2023 3:12 PM  Performed by: Jeremi Aiken MD  Authorized by: Jeremi Aiken MD   Rhythm: atrial fibrillation  Rate: tachycardic  QRS axis: normal  Other findings: non-specific ST-T wave changes    Clinical impression: non-specific ECG  Comments: Afib, no acute sign of ischemia              Assessment/Plan:   Diagnoses and all orders for this visit:    1. Other persistent atrial fibrillation (HCC) (Primary)  Assessment & Plan:  Patient sees Dr. Wren.    Orders:  -     Comprehensive Metabolic Panel; Future  -     Calcium, Ionized; Future  -     PTH, Intact; Future  -     Ambulatory Referral to Cardiology    2. Dyspnea, unspecified type  Assessment & Plan:  Had an episode of shortness of breath couple nights ago that lasted 2 or 3 minutes.  Her heart rate has been elevated.  Dr. Wren had cut her bisoprolol in half down to 2.5 mg.  I am going to increase her up to 5 mg and have her get another appointment see Dr. Wren.  I think her shortness of breath could be related to her elevated heart rate.  We can see if we can get rate control and see if this improves.  I told her if she gets short of breath again or if she has chest pain go to emergency room.  Continue her Xarelto.    Orders:  -     Comprehensive Metabolic Panel; Future  -     Calcium, Ionized; Future  -     PTH, Intact; Future  -     Ambulatory Referral to Cardiology    3. Hypercalcemia  Assessment & Plan:  Repeat CMP showed a normal calcium.  Ionized calcium was normal at 5.6.  PTH was normal at 19.  We are going to check her calcium studies in 3 months.    Orders:  -     Comprehensive Metabolic Panel; Future  -     Calcium, Ionized; Future  -     PTH, Intact; Future    4.  Dysphagia, unspecified type  Assessment & Plan:  Patient has had some trouble with food going down her esophagus.  We are going to get a upper GI.  Return to clinic in 3 weeks.    Orders:  -     FL Upper GI Single Contrast With KUB; Future  -     Comprehensive Metabolic Panel; Future  -     Calcium, Ionized; Future  -     PTH, Intact; Future    Other orders  -     ECG 12 Lead           Follow Up:   Return in about 3 weeks (around 2/23/2023) for Annual physical.    Jeremi Aiken MD  [facility]

## 2023-02-09 ENCOUNTER — TELEPHONE (OUTPATIENT)
Dept: FAMILY MEDICINE CLINIC | Facility: CLINIC | Age: 83
End: 2023-02-09
Payer: MEDICARE

## 2023-02-09 NOTE — TELEPHONE ENCOUNTER
Caller: Melissa Reddy    Relationship: Self    Best call back number: [phone]    What medication are you requesting: EPIPEN    If a prescription is needed, what is your preferred pharmacy and phone number: [id number] - [place], [place] - [address] AT [facility] [address] - [phone]  - [phone] FX     Additional notes: EPIPEN WAS NOT ON  MED LIST

## 2023-02-10 RX ORDER — EPINEPHRINE 0.3 MG/.3ML
0.3 INJECTION SUBCUTANEOUS ONCE
Qty: 1 EACH | Refills: 0 | Status: SHIPPED | OUTPATIENT
Start: 2023-02-10 | End: 2023-02-10

## 2023-02-10 NOTE — TELEPHONE ENCOUNTER
Rx Refill Note    Requested Prescriptions     Pending Prescriptions Disp Refills   • EPINEPHrine (EpiPen 2-Lupillo) 0.3 MG/0.3ML solution auto-injector injection 1 each 0     Sig: Inject 0.3 mL into the appropriate muscle as directed by prescriber 1 (One) Time for 1 dose.           Last office visit with prescribing clinician: 2/2/2023      Next office visit with prescribing clinician: 2/16/2023   Last labs:   Last refill:    Pharmacy [facility]

## 2023-02-10 NOTE — TELEPHONE ENCOUNTER
Rx Refill Note    Requested Prescriptions      No prescriptions requested or ordered in this encounter        Last office visit with prescribing clinician: 2/2/2023      Next office visit with prescribing clinician: 2/16/2023   Last labs:   Last refill:    Pharmacy [facility]

## 2023-02-13 ENCOUNTER — TELEPHONE (OUTPATIENT)
Dept: FAMILY MEDICINE CLINIC | Facility: CLINIC | Age: 83
End: 2023-02-13

## 2023-02-13 NOTE — TELEPHONE ENCOUNTER
Caller: Melissa Reddy    Relationship: Self    Best call back number: [phone]    What is the best time to reach you: ANYTIME    Who are you requesting to speak with (clinical staff, provider,  specific staff member): STAFF    Do you know the name of the person who called: JALEESA    What was the call regarding: PATIENT RETURNED MISSED CALL.  HUB RELAYED MESSAGE.  PATIENT STATED SHE DOES NOT THINK SHE NEEDS TO COME IN.    PLEASE ADVISE    Do you require a callback: YES

## 2023-03-07 ENCOUNTER — HOSPITAL ENCOUNTER (OUTPATIENT)
Dept: GENERAL RADIOLOGY | Facility: HOSPITAL | Age: 83
Discharge: HOME | End: 2023-03-07
Admitting: FAMILY MEDICINE
Payer: MEDICARE

## 2023-03-07 DIAGNOSIS — R13.10 DYSPHAGIA, UNSPECIFIED TYPE: ICD-10-CM

## 2023-03-07 PROCEDURE — 63710000001 BARIUM SULFATE 96 % RECONSTITUTED SUSPENSION: Performed by: FAMILY MEDICINE

## 2023-03-07 PROCEDURE — A9270 NON-COVERED ITEM OR SERVICE: HCPCS | Performed by: FAMILY MEDICINE

## 2023-03-07 PROCEDURE — 74240 X-RAY XM UPR GI TRC 1CNTRST: CPT

## 2023-03-07 RX ADMIN — BARIUM SULFATE 183 ML: 960 POWDER, FOR SUSPENSION ORAL at 10:30

## 2023-03-17 ENCOUNTER — TELEPHONE (OUTPATIENT)
Dept: FAMILY MEDICINE CLINIC | Facility: CLINIC | Age: 83
End: 2023-03-17

## 2023-03-20 RX ORDER — ALENDRONATE SODIUM 70 MG/1
TABLET ORAL
Qty: 4 TABLET | Refills: 0 | Status: SHIPPED | OUTPATIENT
Start: 2023-03-20

## 2023-03-20 NOTE — TELEPHONE ENCOUNTER
Rx Refill Note    Requested Prescriptions     Pending Prescriptions Disp Refills   • alendronate (FOSAMAX) 70 MG tablet [Pharmacy Med Name: ALENDRONATE SODIUM 70 MG TAB] 4 tablet 0     Sig: TAKE 1 TABLET BY MOUTH ONCE WEEKLY ON AN EMPTY STOMACH BEFORE BREAKFAST. REMAIN UPRIGHT FOR 30 MINUTES AND TAKE WITH 8 OUNCES OF WATER        Last office visit with prescribing clinician: 2/2/2023      Next office visit with prescribing clinician: Visit date not found   Last labs:   Last refill: n/a   Pharmacy (be sure to add in Epic). correct

## 2023-04-07 ENCOUNTER — TELEPHONE (OUTPATIENT)
Dept: FAMILY MEDICINE CLINIC | Facility: CLINIC | Age: 83
End: 2023-04-07
Payer: MEDICARE

## 2023-04-07 DIAGNOSIS — E83.52 HYPERCALCEMIA: Primary | ICD-10-CM

## 2023-04-07 DIAGNOSIS — E03.9 ACQUIRED HYPOTHYROIDISM: ICD-10-CM

## 2023-04-07 DIAGNOSIS — E55.9 VITAMIN D DEFICIENCY: ICD-10-CM

## 2023-04-07 NOTE — TELEPHONE ENCOUNTER
Caller: Melissa Reddy    Relationship: Self    Best call back number: [phone]    What orders are you requesting (i.e. lab or imaging): LAB    In what timeframe would the patient need to come in: WEEK BEFORE HER NEXT APPOINTMENT ON 5/1/23.    Where will you receive your lab/imaging services: IN HOUSE    Additional notes: PLEASE CALL TO SCHEDULE ONCE ORDER IS IN. THANK YOU.

## 2023-04-17 RX ORDER — ALENDRONATE SODIUM 70 MG/1
TABLET ORAL
Qty: 4 TABLET | Refills: 0 | Status: SHIPPED | OUTPATIENT
Start: 2023-04-17

## 2023-04-26 ENCOUNTER — LAB (OUTPATIENT)
Dept: FAMILY MEDICINE CLINIC | Facility: CLINIC | Age: 83
End: 2023-04-26
Payer: MEDICARE

## 2023-04-26 DIAGNOSIS — E55.9 VITAMIN D DEFICIENCY: ICD-10-CM

## 2023-04-26 DIAGNOSIS — E03.9 ACQUIRED HYPOTHYROIDISM: ICD-10-CM

## 2023-04-26 DIAGNOSIS — R13.10 DYSPHAGIA, UNSPECIFIED TYPE: ICD-10-CM

## 2023-04-26 DIAGNOSIS — I48.19 OTHER PERSISTENT ATRIAL FIBRILLATION: ICD-10-CM

## 2023-04-26 DIAGNOSIS — R06.00 DYSPNEA, UNSPECIFIED TYPE: ICD-10-CM

## 2023-04-26 DIAGNOSIS — E83.52 HYPERCALCEMIA: ICD-10-CM

## 2023-04-26 PROCEDURE — 36415 COLL VENOUS BLD VENIPUNCTURE: CPT | Performed by: FAMILY MEDICINE

## 2023-04-27 LAB
25(OH)D3+25(OH)D2 SERPL-MCNC: 80.3 NG/ML (ref 30–100)
ALBUMIN SERPL-MCNC: 4.6 G/DL (ref 3.6–4.6)
ALBUMIN/GLOB SERPL: 2.2 {RATIO} (ref 1.2–2.2)
ALP SERPL-CCNC: 88 IU/L (ref 44–121)
ALT SERPL-CCNC: 26 IU/L (ref 0–32)
AST SERPL-CCNC: 38 IU/L (ref 0–40)
BASOPHILS # BLD AUTO: 0 X10E3/UL (ref 0–0.2)
BASOPHILS NFR BLD AUTO: 1 %
BILIRUB SERPL-MCNC: 0.8 MG/DL (ref 0–1.2)
BUN SERPL-MCNC: 15 MG/DL (ref 8–27)
BUN/CREAT SERPL: 19 (ref 12–28)
CA-I SERPL ISE-MCNC: 5.1 MG/DL (ref 4.5–5.6)
CALCIUM SERPL-MCNC: 10.1 MG/DL (ref 8.7–10.3)
CHLORIDE SERPL-SCNC: 101 MMOL/L (ref 96–106)
CO2 SERPL-SCNC: 25 MMOL/L (ref 20–29)
CREAT SERPL-MCNC: 0.81 MG/DL (ref 0.57–1)
EGFRCR SERPLBLD CKD-EPI 2021: 72 ML/MIN/1.73
EOSINOPHIL # BLD AUTO: 0.1 X10E3/UL (ref 0–0.4)
EOSINOPHIL NFR BLD AUTO: 3 %
ERYTHROCYTE [DISTWIDTH] IN BLOOD BY AUTOMATED COUNT: 12.6 % (ref 11.7–15.4)
GLOBULIN SER CALC-MCNC: 2.1 G/DL (ref 1.5–4.5)
GLUCOSE SERPL-MCNC: 93 MG/DL (ref 70–99)
HCT VFR BLD AUTO: 42.2 % (ref 34–46.6)
HGB BLD-MCNC: 13.4 G/DL (ref 11.1–15.9)
IMM GRANULOCYTES # BLD AUTO: 0 X10E3/UL (ref 0–0.1)
IMM GRANULOCYTES NFR BLD AUTO: 0 %
LYMPHOCYTES # BLD AUTO: 1.3 X10E3/UL (ref 0.7–3.1)
LYMPHOCYTES NFR BLD AUTO: 24 %
MCH RBC QN AUTO: 29.6 PG (ref 26.6–33)
MCHC RBC AUTO-ENTMCNC: 31.8 G/DL (ref 31.5–35.7)
MCV RBC AUTO: 93 FL (ref 79–97)
MONOCYTES # BLD AUTO: 0.5 X10E3/UL (ref 0.1–0.9)
MONOCYTES NFR BLD AUTO: 10 %
NEUTROPHILS # BLD AUTO: 3.3 X10E3/UL (ref 1.4–7)
NEUTROPHILS NFR BLD AUTO: 62 %
PLATELET # BLD AUTO: 245 X10E3/UL (ref 150–450)
POTASSIUM SERPL-SCNC: 4.1 MMOL/L (ref 3.5–5.2)
PROT SERPL-MCNC: 6.7 G/DL (ref 6–8.5)
PTH-INTACT SERPL-MCNC: 17 PG/ML (ref 15–65)
RBC # BLD AUTO: 4.53 X10E6/UL (ref 3.77–5.28)
SODIUM SERPL-SCNC: 142 MMOL/L (ref 134–144)
TSH SERPL DL<=0.005 MIU/L-ACNC: 1.1 UIU/ML (ref 0.45–4.5)
WBC # BLD AUTO: 5.2 X10E3/UL (ref 3.4–10.8)

## 2023-05-15 RX ORDER — ALENDRONATE SODIUM 70 MG/1
TABLET ORAL
Qty: 4 TABLET | Refills: 0 | Status: SHIPPED | OUTPATIENT
Start: 2023-05-15

## 2023-05-18 ENCOUNTER — OFFICE VISIT (OUTPATIENT)
Dept: FAMILY MEDICINE CLINIC | Facility: CLINIC | Age: 83
End: 2023-05-18
Payer: MEDICARE

## 2023-05-18 VITALS
DIASTOLIC BLOOD PRESSURE: 84 MMHG | HEART RATE: 73 BPM | RESPIRATION RATE: 14 BRPM | HEIGHT: 69 IN | OXYGEN SATURATION: 98 % | TEMPERATURE: 97.6 F | BODY MASS INDEX: 21.48 KG/M2 | SYSTOLIC BLOOD PRESSURE: 110 MMHG | WEIGHT: 145 LBS

## 2023-05-18 DIAGNOSIS — E78.2 HYPERLIPIDEMIA, MIXED: ICD-10-CM

## 2023-05-18 DIAGNOSIS — E83.52 HYPERCALCEMIA: ICD-10-CM

## 2023-05-18 DIAGNOSIS — Z00.00 MEDICARE ANNUAL WELLNESS VISIT, SUBSEQUENT: Primary | ICD-10-CM

## 2023-05-18 DIAGNOSIS — E55.9 VITAMIN D DEFICIENCY: ICD-10-CM

## 2023-05-18 DIAGNOSIS — I10 ESSENTIAL HYPERTENSION: ICD-10-CM

## 2023-05-18 DIAGNOSIS — E03.9 ACQUIRED HYPOTHYROIDISM: ICD-10-CM

## 2023-05-18 RX ORDER — IPRATROPIUM BROMIDE 42 UG/1
SPRAY, METERED NASAL
COMMUNITY
Start: 2023-04-09 | End: 2023-05-18

## 2023-05-18 NOTE — PROGRESS NOTES
The ABCs of the Annual Wellness Visit  Subsequent Medicare Wellness Visit    Chief Complaint   Patient presents with   • Medicare Wellness-subsequent     Pt here for medicare wellness exam sub   • lab results     Pt here for lab results   • Hypertension   • Hyperlipidemia      Subjective    History of Present Illness:  Melissa Reddy is a 82 y.o. female who presents for a Subsequent Medicare Wellness Visit.    The following portions of the patient's history were reviewed and   updated as appropriate: allergies, current medications, past family history, past medical history, past social history, past surgical history and problem list.    Compared to one year ago, the patient feels her physical   health is the same.    Compared to one year ago, the patient feels her mental   health is better.    Recent Hospitalizations:  She was not admitted to the hospital during the last year.       Current Medical Providers:  Patient Care Team:  Jeremi Aiken MD as PCP - General (Family Medicine)  Andrés Wren MD as Consulting Physician (Cardiology)  Lizbeth Cox OT as Occupational Therapist (Occupational Therapy)    Outpatient Medications Prior to Visit   Medication Sig Dispense Refill   • alendronate (FOSAMAX) 70 MG tablet TAKE 1 TABLET BY MOUTH ONCE WEEKLY ON AN EMPTY STOMACH BEFORE BREAKFAST. REMAIN UPRIGHT FOR 30 MINUTES AND TAKE WITH 8 OUNCES OF WATER 4 tablet 0   • bisoprolol (ZEBeta) 5 MG tablet      • citalopram (CeleXA) 40 MG tablet TAKE ONE TABLET BY MOUTH DAILY 90 tablet 1   • ipratropium (ATROVENT) 0.06 % nasal spray SPRAY TWO SPRAYS IN THE AFFECTED NOSTRIL FOUR TIMES A DAY AS DIRECTED AS NEEDED FOR RHINITIS 15 mL 1   • levothyroxine (SYNTHROID, LEVOTHROID) 88 MCG tablet      • Magnesium Citrate 200 MG tablet      • multivitamin with minerals tablet tablet Take 1 tablet by mouth Daily.     • pravastatin (PRAVACHOL) 20 MG tablet TAKE ONE TABLET BY MOUTH DAILY 90 tablet 1   • Xarelto 20 MG tablet      •  "ipratropium (ATROVENT) 0.06 % nasal spray        No facility-administered medications prior to visit.       No opioid medication identified on active medication list. I have reviewed chart for other potential  high risk medication/s and harmful drug interactions in the elderly.          Aspirin is not on active medication list.  Aspirin use is contraindicated for this patient due to: current use of Xarelto.  .    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  ACP discussion was held with the patient during this visit. Patient has an advance directive (not in EMR), copy requested.    Review of Systems   Constitutional: Negative.    HENT: Negative.    Eyes: Negative.    Respiratory: Negative.    Cardiovascular: Negative.    Gastrointestinal: Negative.         Objective    Vitals:    05/18/23 1036   BP: 110/84   BP Location: Left arm   Patient Position: Sitting   Cuff Size: Adult   Pulse: 73   Resp: 14   Temp: 97.6 °F (36.4 °C)   TempSrc: Temporal   SpO2: 98%   Weight: 65.8 kg (145 lb)   Height: 175.3 cm (69\")   PainSc: 0-No pain     Estimated body mass index is 21.41 kg/m² as calculated from the following:    Height as of this encounter: 175.3 cm (69\").    Weight as of this encounter: 65.8 kg (145 lb).    BMI is within normal parameters. No other follow-up for BMI required.      Does the patient have evidence of cognitive " impairment? No    Physical Exam  Vitals and nursing note reviewed.   Constitutional:       Appearance: Normal appearance. She is normal weight.   HENT:      Head: Normocephalic and atraumatic.      Right Ear: Tympanic membrane, ear canal and external ear normal.      Left Ear: Tympanic membrane, ear canal and external ear normal.      Nose: Nose normal.      Mouth/Throat:      Mouth: Mucous membranes are dry.      Pharynx: Oropharynx is clear.   Eyes:      Extraocular Movements: Extraocular movements intact.      Conjunctiva/sclera: Conjunctivae normal.      Pupils: Pupils are equal, round, and reactive to light.   Cardiovascular:      Rate and Rhythm: Normal rate and regular rhythm.      Pulses: Normal pulses.      Heart sounds: Normal heart sounds.   Pulmonary:      Effort: Pulmonary effort is normal.      Breath sounds: Normal breath sounds.   Musculoskeletal:      Cervical back: Normal range of motion and neck supple.   Feet:      Comments:      Neurological:      Mental Status: She is alert.                 HEALTH RISK ASSESSMENT    Smoking Status:  Social History     Tobacco Use   Smoking Status Former   • Packs/day: 0.25   • Years: 44.00   • Pack years: 11.00   • Types: Cigarettes   • Start date:    • Quit date:    • Years since quittin.3   Smokeless Tobacco Never     Alcohol Consumption:  Social History     Substance and Sexual Activity   Alcohol Use Yes     Fall Risk Screen:    STEADI Fall Risk Assessment was completed, and patient is at MODERATE risk for falls. Assessment completed on:2023    Depression Screenin/18/2023    10:40 AM   PHQ-2/PHQ-9 Depression Screening   Little Interest or Pleasure in Doing Things 0-->not at all   Feeling Down, Depressed or Hopeless 0-->not at all   PHQ-9: Brief Depression Severity Measure Score 0       Health Habits and Functional and Cognitive Screenin/18/2023    10:40 AM   Functional & Cognitive Status   Do you have difficulty preparing  food and eating? No   Do you have difficulty bathing yourself, getting dressed or grooming yourself? No   Do you have difficulty using the toilet? No   Do you have difficulty moving around from place to place? No   Do you have trouble with steps or getting out of a bed or a chair? No   Current Diet Well Balanced Diet   Dental Exam Up to date   Eye Exam Up to date   Exercise (times per week) 3 times per week   Current Exercises Include Yard Work   Do you need help using the phone?  No   Are you deaf or do you have serious difficulty hearing?  Yes   Do you need help with transportation? No   Do you need help shopping? No   Do you need help preparing meals?  No   Do you need help with housework?  No   Do you need help with laundry? No   Do you need help taking your medications? No   Do you need help managing money? No   Do you ever drive or ride in a car without wearing a seat belt? No   Have you felt unusual stress, anger or loneliness in the last month? No   Who do you live with? Spouse   If you need help, do you have trouble finding someone available to you? No   Have you been bothered in the last four weeks by sexual problems? No   Do you have difficulty concentrating, remembering or making decisions? No       Age-appropriate Screening Schedule:  Refer to the list below for future screening recommendations based on patient's age, sex and/or medical conditions. Orders for these recommended tests are listed in the plan section. The patient has been provided with a written plan.    Health Maintenance   Topic Date Due   • COVID-19 Vaccine (6 - Booster for Pfizer series) 11/10/2022   • DXA SCAN  06/23/2023   • INFLUENZA VACCINE  08/01/2023   • LIPID PANEL  01/06/2024   • ANNUAL WELLNESS VISIT  05/18/2024   • TDAP/TD VACCINES (2 - Td or Tdap) 10/03/2028   • Pneumococcal Vaccine 65+  Completed   • ZOSTER VACCINE  Completed              Assessment & Plan   CMS Preventative Services Quick Reference  Risk Factors Identified  During Encounter  None Identified  The above risks/problems have been discussed with the patient.  Follow up actions/plans if indicated are seen below in the Assessment/Plan Section.  Pertinent information has been shared with the patient in the After Visit Summary.    Diagnoses and all orders for this visit:    1. Medicare annual wellness visit, subsequent (Primary)  Assessment & Plan:  Patient is feeling better and her Blood work is better.  Wanda recheck Blood work in 3 months as long as this looks good we will recheck her in every 6 months after that.    Orders:  -     Lipid Panel; Future  -     Comprehensive Metabolic Panel; Future  -     TSH Rfx On Abnormal To Free T4; Future  -     Vitamin D,25-Hydroxy; Future  -     CBC & Differential; Future    2. Hyperlipidemia, mixed  Assessment & Plan:  Blood work in 3 months    Orders:  -     Lipid Panel; Future  -     Comprehensive Metabolic Panel; Future  -     TSH Rfx On Abnormal To Free T4; Future  -     Vitamin D,25-Hydroxy; Future  -     CBC & Differential; Future    3. Essential hypertension  Assessment & Plan:  Discussed with patient to monitor their blood pressure and if systolic blood pressure goes above 140 or diastolic is above 90 to return to clinic.  Take medicines as directed, call for any problems, patient not having or any worrisome symptoms.        Orders:  -     Lipid Panel; Future  -     Comprehensive Metabolic Panel; Future  -     TSH Rfx On Abnormal To Free T4; Future  -     Vitamin D,25-Hydroxy; Future  -     CBC & Differential; Future    4. Acquired hypothyroidism  Assessment & Plan:  TSH is 1.10.  Recheck in 6 months.    Orders:  -     Lipid Panel; Future  -     Comprehensive Metabolic Panel; Future  -     TSH Rfx On Abnormal To Free T4; Future  -     Vitamin D,25-Hydroxy; Future  -     CBC & Differential; Future    5. Vitamin D deficiency  Assessment & Plan:  Blood work in 3-month    Orders:  -     Lipid Panel; Future  -     Comprehensive  Metabolic Panel; Future  -     TSH Rfx On Abnormal To Free T4; Future  -     Vitamin D,25-Hydroxy; Future  -     CBC & Differential; Future    6. Hypercalcemia  Assessment & Plan:  Calcium is 10.1.  PTH 17.  Recheck in 3 months.    Orders:  -     Lipid Panel; Future  -     Comprehensive Metabolic Panel; Future  -     TSH Rfx On Abnormal To Free T4; Future  -     Vitamin D,25-Hydroxy; Future  -     CBC & Differential; Future  -     PTH, Intact; Future      Follow Up:   Return in about 3 months (around 8/18/2023) for Annual physical, Bloodwork 1 week prior to next appointment.     An After Visit Summary and PPPS were made available to the patient.          I spent 10 minutes caring for Melissa on this date of service. This time includes time spent by me in the following activities:preparing for the visit, reviewing tests, obtaining and/or reviewing a separately obtained history, performing a medically appropriate examination and/or evaluation , counseling and educating the patient/family/caregiver, ordering medications, tests, or procedures, referring and communicating with other health care professionals , documenting information in the medical record, independently interpreting results and communicating that information with the patient/family/caregiver and care coordination           Answers for HPI/ROS submitted by the patient on 5/17/2023  Please describe your symptoms.: Followup.  Have you had these symptoms before?: Yes  How long have you been having these symptoms?: Greater than 2 weeks  Please list any medications you are currently taking for this condition.: None  Please describe any probable cause for these symptoms. : None  What is the primary reason for your visit?: Other

## 2023-05-18 NOTE — ASSESSMENT & PLAN NOTE
Patient is feeling better and her Blood work is better.  Wanda recheck Blood work in 3 months as long as this looks good we will recheck her in every 6 months after that.   Diabetes mellitus Diabetes mellitus Diabetes mellitus Diabetes mellitus Diabetes mellitus Diabetes mellitus Diabetes mellitus Diabetes mellitus Diabetes mellitus Diabetes mellitus Diabetes mellitus

## 2023-06-11 DIAGNOSIS — J06.9 URI WITH COUGH AND CONGESTION: ICD-10-CM

## 2023-06-13 RX ORDER — IPRATROPIUM BROMIDE 42 UG/1
SPRAY, METERED NASAL
Qty: 15 ML | Refills: 1 | Status: SHIPPED | OUTPATIENT
Start: 2023-06-13

## 2023-06-15 RX ORDER — ALENDRONATE SODIUM 70 MG/1
TABLET ORAL
Qty: 4 TABLET | Refills: 0 | Status: SHIPPED | OUTPATIENT
Start: 2023-06-15

## 2023-08-09 RX ORDER — PRAVASTATIN SODIUM 20 MG
TABLET ORAL
Qty: 90 TABLET | Refills: 0 | Status: SHIPPED | OUTPATIENT
Start: 2023-08-09

## 2023-08-09 NOTE — TELEPHONE ENCOUNTER
Rx Refill Note    Requested Prescriptions     Pending Prescriptions Disp Refills    pravastatin (PRAVACHOL) 20 MG tablet [Pharmacy Med Name: PRAVASTATIN SODIUM 20 MG TAB] 90 tablet 0     Sig: TAKE ONE TABLET BY MOUTH DAILY        Last office visit with prescribing clinician: 5/18/2023      Next office visit with prescribing clinician: 8/17/2023   Last labs:   Last refill: 01/03/2023   Pharmacy (be sure to add in Epic). correct

## 2023-08-10 ENCOUNTER — LAB (OUTPATIENT)
Dept: FAMILY MEDICINE CLINIC | Facility: CLINIC | Age: 83
End: 2023-08-10
Payer: MEDICARE

## 2023-08-10 DIAGNOSIS — E78.2 HYPERLIPIDEMIA, MIXED: ICD-10-CM

## 2023-08-10 DIAGNOSIS — E83.52 HYPERCALCEMIA: ICD-10-CM

## 2023-08-10 DIAGNOSIS — E55.9 VITAMIN D DEFICIENCY: ICD-10-CM

## 2023-08-10 DIAGNOSIS — I10 ESSENTIAL HYPERTENSION: ICD-10-CM

## 2023-08-10 DIAGNOSIS — E03.9 ACQUIRED HYPOTHYROIDISM: ICD-10-CM

## 2023-08-10 DIAGNOSIS — Z00.00 MEDICARE ANNUAL WELLNESS VISIT, SUBSEQUENT: ICD-10-CM

## 2023-08-10 PROCEDURE — 36415 COLL VENOUS BLD VENIPUNCTURE: CPT | Performed by: FAMILY MEDICINE

## 2023-08-10 RX ORDER — ALENDRONATE SODIUM 70 MG/1
TABLET ORAL
Qty: 4 TABLET | Refills: 0 | Status: SHIPPED | OUTPATIENT
Start: 2023-08-10

## 2023-08-10 NOTE — TELEPHONE ENCOUNTER
Rx Refill Note    Requested Prescriptions     Pending Prescriptions Disp Refills    alendronate (FOSAMAX) 70 MG tablet [Pharmacy Med Name: ALENDRONATE SODIUM 70 MG TAB] 4 tablet 0     Sig: TAKE 1 TABLET BY MOUTH ONCE WEEKLY ON AN EMPTY STOMACH BEFORE BREAKFAST. REMAIN UPRIGHT FOR 30 MINUTES AND TAKE WITH 8 OUNCES OF WATER        Last office visit with prescribing clinician: 5/18/2023      Next office visit with prescribing clinician: 8/17/2023   Last labs:   Last refill: 07/14/2023   Pharmacy (be sure to add in Epic). correct

## 2023-08-11 LAB
25(OH)D3+25(OH)D2 SERPL-MCNC: 73.6 NG/ML (ref 30–100)
ALBUMIN SERPL-MCNC: 4.6 G/DL (ref 3.7–4.7)
ALBUMIN/GLOB SERPL: 2.1 {RATIO} (ref 1.2–2.2)
ALP SERPL-CCNC: 85 IU/L (ref 44–121)
ALT SERPL-CCNC: 24 IU/L (ref 0–32)
AST SERPL-CCNC: 28 IU/L (ref 0–40)
BASOPHILS # BLD AUTO: 0 X10E3/UL (ref 0–0.2)
BASOPHILS NFR BLD AUTO: 1 %
BILIRUB SERPL-MCNC: 0.8 MG/DL (ref 0–1.2)
BUN SERPL-MCNC: 16 MG/DL (ref 8–27)
BUN/CREAT SERPL: 20 (ref 12–28)
CALCIUM SERPL-MCNC: 10.2 MG/DL (ref 8.7–10.3)
CHLORIDE SERPL-SCNC: 99 MMOL/L (ref 96–106)
CHOLEST SERPL-MCNC: 165 MG/DL (ref 100–199)
CO2 SERPL-SCNC: 26 MMOL/L (ref 20–29)
CREAT SERPL-MCNC: 0.8 MG/DL (ref 0.57–1)
EGFRCR SERPLBLD CKD-EPI 2021: 72 ML/MIN/1.73
EOSINOPHIL # BLD AUTO: 0.1 X10E3/UL (ref 0–0.4)
EOSINOPHIL NFR BLD AUTO: 2 %
ERYTHROCYTE [DISTWIDTH] IN BLOOD BY AUTOMATED COUNT: 13 % (ref 11.7–15.4)
GLOBULIN SER CALC-MCNC: 2.2 G/DL (ref 1.5–4.5)
GLUCOSE SERPL-MCNC: 98 MG/DL (ref 70–99)
HCT VFR BLD AUTO: 41.2 % (ref 34–46.6)
HDLC SERPL-MCNC: 50 MG/DL
HGB BLD-MCNC: 13.4 G/DL (ref 11.1–15.9)
IMM GRANULOCYTES # BLD AUTO: 0 X10E3/UL (ref 0–0.1)
IMM GRANULOCYTES NFR BLD AUTO: 0 %
LDLC SERPL CALC-MCNC: 102 MG/DL (ref 0–99)
LYMPHOCYTES # BLD AUTO: 1.5 X10E3/UL (ref 0.7–3.1)
LYMPHOCYTES NFR BLD AUTO: 27 %
MCH RBC QN AUTO: 29.9 PG (ref 26.6–33)
MCHC RBC AUTO-ENTMCNC: 32.5 G/DL (ref 31.5–35.7)
MCV RBC AUTO: 92 FL (ref 79–97)
MONOCYTES # BLD AUTO: 0.6 X10E3/UL (ref 0.1–0.9)
MONOCYTES NFR BLD AUTO: 10 %
NEUTROPHILS # BLD AUTO: 3.4 X10E3/UL (ref 1.4–7)
NEUTROPHILS NFR BLD AUTO: 60 %
PLATELET # BLD AUTO: 214 X10E3/UL (ref 150–450)
POTASSIUM SERPL-SCNC: 4.2 MMOL/L (ref 3.5–5.2)
PROT SERPL-MCNC: 6.8 G/DL (ref 6–8.5)
PTH-INTACT SERPL-MCNC: 18 PG/ML (ref 15–65)
RBC # BLD AUTO: 4.48 X10E6/UL (ref 3.77–5.28)
SODIUM SERPL-SCNC: 140 MMOL/L (ref 134–144)
TRIGL SERPL-MCNC: 68 MG/DL (ref 0–149)
TSH SERPL DL<=0.005 MIU/L-ACNC: 1.97 UIU/ML (ref 0.45–4.5)
VLDLC SERPL CALC-MCNC: 13 MG/DL (ref 5–40)
WBC # BLD AUTO: 5.7 X10E3/UL (ref 3.4–10.8)

## 2023-09-11 RX ORDER — ALENDRONATE SODIUM 70 MG/1
TABLET ORAL
Qty: 4 TABLET | Refills: 0 | Status: SHIPPED | OUTPATIENT
Start: 2023-09-11 | End: 2023-09-13 | Stop reason: SDUPTHER

## 2023-09-11 NOTE — TELEPHONE ENCOUNTER
Rx Refill Note    Requested Prescriptions     Pending Prescriptions Disp Refills    alendronate (FOSAMAX) 70 MG tablet [Pharmacy Med Name: ALENDRONATE SODIUM 70 MG TAB] 4 tablet 0     Sig: TAKE 1 TABLET BY MOUTH ONCE WEEKLY ON AN EMPTY STOMACH BEFORE BREAKFAST. REMAIN UPRIGHT FOR 30 MINUTES AND TAKE WITH 8 OUNCES OF WATER        Last office visit with prescribing clinician: 5/18/2023      Next office visit with prescribing clinician: 9/13/2023   Last labs:   Last refill: 08/10/2023   Pharmacy (be sure to add in Epic). correct

## 2023-09-13 ENCOUNTER — OFFICE VISIT (OUTPATIENT)
Dept: FAMILY MEDICINE CLINIC | Facility: CLINIC | Age: 83
End: 2023-09-13
Payer: MEDICARE

## 2023-09-13 VITALS
HEART RATE: 88 BPM | HEIGHT: 69 IN | OXYGEN SATURATION: 97 % | BODY MASS INDEX: 21.37 KG/M2 | DIASTOLIC BLOOD PRESSURE: 80 MMHG | RESPIRATION RATE: 16 BRPM | SYSTOLIC BLOOD PRESSURE: 128 MMHG | WEIGHT: 144.3 LBS

## 2023-09-13 DIAGNOSIS — E03.9 ACQUIRED HYPOTHYROIDISM: ICD-10-CM

## 2023-09-13 DIAGNOSIS — E78.2 HYPERLIPIDEMIA, MIXED: ICD-10-CM

## 2023-09-13 DIAGNOSIS — I10 ESSENTIAL HYPERTENSION: Primary | ICD-10-CM

## 2023-09-13 DIAGNOSIS — E83.52 HYPERCALCEMIA: ICD-10-CM

## 2023-09-13 DIAGNOSIS — E55.9 VITAMIN D DEFICIENCY: ICD-10-CM

## 2023-09-13 PROCEDURE — 99214 OFFICE O/P EST MOD 30 MIN: CPT | Performed by: FAMILY MEDICINE

## 2023-09-13 PROCEDURE — 3074F SYST BP LT 130 MM HG: CPT | Performed by: FAMILY MEDICINE

## 2023-09-13 PROCEDURE — 3079F DIAST BP 80-89 MM HG: CPT | Performed by: FAMILY MEDICINE

## 2023-09-13 RX ORDER — ALENDRONATE SODIUM 70 MG/1
70 TABLET ORAL
Qty: 12 TABLET | Refills: 0 | Status: SHIPPED | OUTPATIENT
Start: 2023-09-13

## 2023-09-13 NOTE — PROGRESS NOTES
Patient Name: Melissa Reddy  : 1940   MRN: [id number]     Chief Complaint:    Chief Complaint   Patient presents with    Follow-up       History of Present Illness: Melissa Reddy is a 83 y.o. female who is here today for follow up.  HPI        Review of Systems:   Review of Systems   Constitutional: Negative.    HENT: Negative.     Eyes: Negative.    Respiratory: Negative.     Cardiovascular: Negative.    Gastrointestinal: Negative.    Neurological: Negative.       Past Medical History:   Past Medical History:   Diagnosis Date    Acquired hypothyroidism     Allergic     WASP STINGS    AR (allergic rhinitis)     Atrial fibrillation     Chronic anticoagulation     Chronic back pain     Chronic kidney disease     STAGE 2    Disorder     DJD (degenerative joint disease)     INVOLVING MULTIPLE JOINTS    Essential hypertension     High risk medication use     Hypercholesterolemia     Hypertension     Hypothyroidism     Long term current use of anticoagulant     Osteoarthritis     Osteopenia     Other persistent atrial fibrillation 5/3/2022    Persistent atrial fibrillation     Recurrent major depression in full remission     Rotator cuff disorder     Tobacco use        Past Surgical History:   Past Surgical History:   Procedure Laterality Date    HYSTERECTOMY      TOTAL    ROTATOR CUFF REPAIR      TONSILLECTOMY         Family History:   Family History   Problem Relation Age of Onset    Alcohol abuse Father     Brain cancer Sister        Social History:   Social History     Socioeconomic History    Marital status:    Tobacco Use    Smoking status: Former     Packs/day: 0.25     Years: 44.00     Pack years: 11.00     Types: Cigarettes     Start date:      Quit date: 2000     Years since quittin.7    Smokeless tobacco: Never   Vaping Use    Vaping Use: Never used   Substance and Sexual Activity    Alcohol use: Yes    Drug use: Never       Medications:     Current Outpatient  "Medications:     alendronate (FOSAMAX) 70 MG tablet, Take 1 tablet by mouth Every 7 (Seven) Days., Disp: 12 tablet, Rfl: 0    bisoprolol (ZEBeta) 5 MG tablet, , Disp: , Rfl:     citalopram (CeleXA) 40 MG tablet, TAKE ONE TABLET BY MOUTH DAILY, Disp: 90 tablet, Rfl: 1    ipratropium (ATROVENT) 0.06 % nasal spray, SPRAY TWO SPRAYS IN THE AFFECTED NOSTRIL FOUR TIMES A DAY AS DIRECTED AS NEEDED FOR RHINITIS, Disp: 15 mL, Rfl: 1    levothyroxine (SYNTHROID, LEVOTHROID) 88 MCG tablet, , Disp: , Rfl:     Magnesium Citrate 200 MG tablet, , Disp: , Rfl:     multivitamin with minerals tablet tablet, Take 1 tablet by mouth Daily., Disp: , Rfl:     pravastatin (PRAVACHOL) 20 MG tablet, TAKE ONE TABLET BY MOUTH DAILY, Disp: 90 tablet, Rfl: 0    Allergies:   Allergies   Allergen Reactions    Wasp Venom Protein Hives and Swelling         Physical Exam:  Vital Signs:   Vitals:    09/13/23 1404   BP: 128/80   BP Location: Left arm   Patient Position: Sitting   Cuff Size: Adult   Pulse: 88   Resp: 16   SpO2: 97%   Weight: 65.5 kg (144 lb 4.8 oz)   Height: 175.3 cm (69.02\")   PainSc: 0-No pain     Body mass index is 21.3 kg/m².     Physical Exam  Vitals and nursing note reviewed.   Constitutional:       Appearance: Normal appearance. She is normal weight.   HENT:      Head: Normocephalic and atraumatic.      Right Ear: Tympanic membrane, ear canal and external ear normal.      Left Ear: Tympanic membrane, ear canal and external ear normal.      Nose: Nose normal.      Mouth/Throat:      Mouth: Mucous membranes are dry.      Pharynx: Oropharynx is clear.   Eyes:      Extraocular Movements: Extraocular movements intact.      Conjunctiva/sclera: Conjunctivae normal.      Pupils: Pupils are equal, round, and reactive to light.   Cardiovascular:      Rate and Rhythm: Normal rate and regular rhythm.      Pulses: Normal pulses.      Heart sounds: Normal heart sounds.   Pulmonary:      Effort: Pulmonary effort is normal.      Breath sounds: " Normal breath sounds.   Musculoskeletal:      Cervical back: Normal range of motion and neck supple.   Feet:      Comments:      Neurological:      Mental Status: She is alert.       Procedures      Assessment/Plan:   Diagnoses and all orders for this visit:    1. Essential hypertension (Primary)  Assessment & Plan:  Discussed with patient to monitor their blood pressure and if systolic blood pressure goes above 140 or diastolic is above 90 to return to clinic.  Take medicines as directed, call for any problems, patient not having or any worrisome symptoms.        Orders:  -     Vitamin B12; Future  -     Vitamin D,25-Hydroxy; Future  -     Lipid Panel; Future  -     Comprehensive Metabolic Panel; Future  -     TSH Rfx On Abnormal To Free T4; Future  -     CBC & Differential; Future    2. Hyperlipidemia, mixed  Assessment & Plan:  HDL 58.  .  Recheck in 6 months.    Orders:  -     Vitamin B12; Future  -     Vitamin D,25-Hydroxy; Future  -     Lipid Panel; Future  -     Comprehensive Metabolic Panel; Future  -     TSH Rfx On Abnormal To Free T4; Future  -     CBC & Differential; Future    3. Vitamin D deficiency  Assessment & Plan:  D73.6.  We will follow.    Orders:  -     Vitamin B12; Future  -     Vitamin D,25-Hydroxy; Future  -     Lipid Panel; Future  -     Comprehensive Metabolic Panel; Future  -     TSH Rfx On Abnormal To Free T4; Future  -     CBC & Differential; Future    4. Hypercalcemia  -     Vitamin B12; Future  -     Vitamin D,25-Hydroxy; Future  -     Lipid Panel; Future  -     Comprehensive Metabolic Panel; Future  -     TSH Rfx On Abnormal To Free T4; Future  -     CBC & Differential; Future    5. Acquired hypothyroidism  -     Vitamin B12; Future  -     Vitamin D,25-Hydroxy; Future  -     Lipid Panel; Future  -     Comprehensive Metabolic Panel; Future  -     TSH Rfx On Abnormal To Free T4; Future  -     CBC & Differential; Future    Other orders  -     alendronate (FOSAMAX) 70 MG tablet;  Take 1 tablet by mouth Every 7 (Seven) Days.  Dispense: 12 tablet; Refill: 0             Follow Up:   Return in about 6 months (around 3/13/2024) for Bloodwork 1 week prior to next appointment, Recheck.    Jeremi Aiken MD  [facility]

## 2023-09-13 NOTE — ASSESSMENT & PLAN NOTE
Calcium is 10.2.  PTH was 18.  Patient feeling well.  CBC is okay.  We will have her recheck all this in 6 months.

## 2023-10-12 RX ORDER — ALENDRONATE SODIUM 70 MG/1
TABLET ORAL
Qty: 4 TABLET | OUTPATIENT
Start: 2023-10-12

## 2023-11-06 RX ORDER — PRAVASTATIN SODIUM 20 MG
20 TABLET ORAL DAILY
Qty: 90 TABLET | Refills: 0 | Status: SHIPPED | OUTPATIENT
Start: 2023-11-06

## 2023-12-05 RX ORDER — ALENDRONATE SODIUM 70 MG/1
TABLET ORAL
Qty: 4 TABLET | Refills: 0 | Status: SHIPPED | OUTPATIENT
Start: 2023-12-05

## 2023-12-05 NOTE — TELEPHONE ENCOUNTER
Rx Refill Note    Requested Prescriptions     Pending Prescriptions Disp Refills    alendronate (FOSAMAX) 70 MG tablet [Pharmacy Med Name: ALENDRONATE SODIUM 70 MG TAB] 4 tablet 0     Sig: TAKE 1 TABLET BY MOUTH ONCE WEEKLY ON AN EMPTY STOMACH BEFORE BREAKFAST. REMAIN UPRIGHT FOR 30 MINUTES AND TAKE WITH 8 OUNCES OF WATER        Last office visit with prescribing clinician: 9/13/2023      Next office visit with prescribing clinician: Visit date not found   Last labs:   Last refill: 09/13/2023   Pharmacy (be sure to add in Epic). correct

## 2023-12-12 RX ORDER — PRAVASTATIN SODIUM 20 MG
20 TABLET ORAL DAILY
Qty: 90 TABLET | Refills: 0 | OUTPATIENT
Start: 2023-12-12

## 2023-12-14 RX ORDER — PRAVASTATIN SODIUM 20 MG
20 TABLET ORAL DAILY
Qty: 90 TABLET | Refills: 0 | Status: SHIPPED | OUTPATIENT
Start: 2023-12-14

## 2023-12-14 RX ORDER — ALENDRONATE SODIUM 70 MG/1
70 TABLET ORAL
Qty: 4 TABLET | Refills: 11 | Status: SHIPPED | OUTPATIENT
Start: 2023-12-14

## 2023-12-14 NOTE — TELEPHONE ENCOUNTER
Rx Refill Note    Requested Prescriptions     Pending Prescriptions Disp Refills    pravastatin (PRAVACHOL) 20 MG tablet 90 tablet 0     Sig: Take 1 tablet by mouth Daily.    alendronate (FOSAMAX) 70 MG tablet 4 tablet 0        Last office visit with prescribing clinician: 9/13/2023      Next office visit with prescribing clinician: Visit date not found   Last labs:   Last refill: patient states that she lost her scripts and needs them resent in.  She states that she will pay out of pocket   Pharmacy (be sure to add in Epic). correct

## 2024-01-10 DIAGNOSIS — J06.9 URI WITH COUGH AND CONGESTION: ICD-10-CM

## 2024-01-11 RX ORDER — IPRATROPIUM BROMIDE 42 UG/1
SPRAY, METERED NASAL
Qty: 15 ML | Refills: 1 | Status: SHIPPED | OUTPATIENT
Start: 2024-01-11

## 2024-01-11 NOTE — TELEPHONE ENCOUNTER
Rx Refill Note    Requested Prescriptions     Pending Prescriptions Disp Refills    ipratropium (ATROVENT) 0.06 % nasal spray [Pharmacy Med Name: IPRATROPIUM 0.06% SPRAY] 15 mL 1     Sig: SPRAY TWO SPRAYS IN EACH AFFECTED NOSTRIL FOUR TIMES A DAY AS NEEDED FOR RHINITIS AS DIRECTED        Last office visit with prescribing clinician: 12/7/2022      Next office visit with prescribing clinician: Visit date not found   Last labs:   Last refill: 06/13/2023   Pharmacy (be sure to add in Epic). correct

## 2024-01-26 DIAGNOSIS — F33.1 MODERATE EPISODE OF RECURRENT MAJOR DEPRESSIVE DISORDER: ICD-10-CM

## 2024-01-26 RX ORDER — CITALOPRAM 40 MG/1
TABLET ORAL
Qty: 90 TABLET | Refills: 0 | Status: SHIPPED | OUTPATIENT
Start: 2024-01-26

## 2024-01-26 NOTE — TELEPHONE ENCOUNTER
Rx Refill Note    Requested Prescriptions     Pending Prescriptions Disp Refills    citalopram (CeleXA) 40 MG tablet [Pharmacy Med Name: CITALOPRAM HBR 40 MG TABLET] 90 tablet 0     Sig: TAKE ONE TABLET BY MOUTH DAILY        Last office visit with prescribing clinician: 9/13/2023      Next office visit with prescribing clinician: Visit date not found   Last labs:   Last refill: last sent 01/03/2023   Pharmacy (be sure to add in Epic). correct

## 2024-03-12 RX ORDER — PRAVASTATIN SODIUM 20 MG
20 TABLET ORAL DAILY
Qty: 90 TABLET | Refills: 0 | Status: SHIPPED | OUTPATIENT
Start: 2024-03-12

## 2024-03-21 ENCOUNTER — OFFICE VISIT (OUTPATIENT)
Dept: FAMILY MEDICINE CLINIC | Facility: CLINIC | Age: 84
End: 2024-03-21
Payer: MEDICARE

## 2024-03-21 VITALS
HEIGHT: 69 IN | DIASTOLIC BLOOD PRESSURE: 80 MMHG | RESPIRATION RATE: 16 BRPM | OXYGEN SATURATION: 98 % | SYSTOLIC BLOOD PRESSURE: 120 MMHG | BODY MASS INDEX: 21.59 KG/M2 | WEIGHT: 145.8 LBS

## 2024-03-21 DIAGNOSIS — E78.2 HYPERLIPIDEMIA, MIXED: ICD-10-CM

## 2024-03-21 DIAGNOSIS — I10 ESSENTIAL HYPERTENSION: Primary | ICD-10-CM

## 2024-03-21 DIAGNOSIS — E55.9 VITAMIN D DEFICIENCY: ICD-10-CM

## 2024-03-21 DIAGNOSIS — E03.9 ACQUIRED HYPOTHYROIDISM: ICD-10-CM

## 2024-03-21 DIAGNOSIS — I48.19 OTHER PERSISTENT ATRIAL FIBRILLATION: ICD-10-CM

## 2024-03-21 RX ORDER — RIVAROXABAN 15 MG/1
15 TABLET, FILM COATED ORAL
COMMUNITY
Start: 2023-12-23

## 2024-03-21 NOTE — PROGRESS NOTES
Patient Name: Melissa Reddy  : 1940   MRN: [id number]     Chief Complaint:    Chief Complaint   Patient presents with    Follow up Lab       History of Present Illness: Melissa Reddy is a 83 y.o. female who is here today for follow up on cholesterol and thyroid  HPI        Review of Systems:   Review of Systems   Constitutional: Negative.    HENT: Negative.     Eyes: Negative.    Respiratory: Negative.     Cardiovascular: Negative.    Gastrointestinal: Negative.    Neurological: Negative.         Past Medical History:   Past Medical History:   Diagnosis Date    Acquired hypothyroidism     Allergic     WASP STINGS    AR (allergic rhinitis)     Atrial fibrillation     Chronic anticoagulation     Chronic back pain     Chronic kidney disease     STAGE 2    Disorder     DJD (degenerative joint disease)     INVOLVING MULTIPLE JOINTS    Essential hypertension     High risk medication use     Hypercholesterolemia     Hypertension     Hypothyroidism     Long term current use of anticoagulant     Osteoarthritis     Osteopenia     Other persistent atrial fibrillation 5/3/2022    Persistent atrial fibrillation     Recurrent major depression in full remission     Rotator cuff disorder     Tobacco use        Past Surgical History:   Past Surgical History:   Procedure Laterality Date    HYSTERECTOMY      TOTAL    ROTATOR CUFF REPAIR      TONSILLECTOMY         Family History:   Family History   Problem Relation Age of Onset    Alcohol abuse Father     Brain cancer Sister        Social History:   Social History     Socioeconomic History    Marital status:    Tobacco Use    Smoking status: Former     Current packs/day: 0.00     Average packs/day: 0.3 packs/day for 44.0 years (11.0 ttl pk-yrs)     Types: Cigarettes     Start date:      Quit date: 2000     Years since quittin.2    Smokeless tobacco: Never   Vaping Use    Vaping status: Never Used   Substance and Sexual Activity    Alcohol use:  "Yes    Drug use: Never       Medications:     Current Outpatient Medications:     alendronate (FOSAMAX) 70 MG tablet, Take 1 tablet by mouth Every 7 (Seven) Days., Disp: 4 tablet, Rfl: 11    bisoprolol (ZEBeta) 5 MG tablet, , Disp: , Rfl:     citalopram (CeleXA) 40 MG tablet, TAKE ONE TABLET BY MOUTH DAILY, Disp: 90 tablet, Rfl: 0    ipratropium (ATROVENT) 0.06 % nasal spray, SPRAY TWO SPRAYS IN EACH AFFECTED NOSTRIL FOUR TIMES A DAY AS NEEDED FOR RHINITIS AS DIRECTED, Disp: 15 mL, Rfl: 1    levothyroxine (SYNTHROID, LEVOTHROID) 88 MCG tablet, , Disp: , Rfl:     Magnesium Citrate 200 MG tablet, , Disp: , Rfl:     multivitamin with minerals tablet tablet, Take 1 tablet by mouth Daily., Disp: , Rfl:     pravastatin (PRAVACHOL) 20 MG tablet, TAKE 1 TABLET BY MOUTH DAILY, Disp: 90 tablet, Rfl: 0    Xarelto 15 MG tablet, Take 1 tablet by mouth Daily With Dinner., Disp: , Rfl:     Allergies:   Allergies   Allergen Reactions    Wasp Venom Protein Hives and Swelling         Physical Exam:  Vital Signs:   Vitals:    03/21/24 1501   BP: 120/80   BP Location: Left arm   Patient Position: Sitting   Cuff Size: Adult   Resp: 16   SpO2: 98%   Weight: 66.1 kg (145 lb 12.8 oz)   Height: 175.3 cm (69.02\")   PainSc: 0-No pain     Body mass index is 21.52 kg/m².     Physical Exam  Vitals and nursing note reviewed.   Constitutional:       Appearance: Normal appearance. She is normal weight.   HENT:      Head: Normocephalic and atraumatic.      Right Ear: Tympanic membrane, ear canal and external ear normal.      Left Ear: Tympanic membrane, ear canal and external ear normal.      Nose: Nose normal.      Mouth/Throat:      Mouth: Mucous membranes are dry.      Pharynx: Oropharynx is clear.   Eyes:      Extraocular Movements: Extraocular movements intact.      Conjunctiva/sclera: Conjunctivae normal.      Pupils: Pupils are equal, round, and reactive to light.   Cardiovascular:      Rate and Rhythm: Normal rate and regular rhythm.      " Pulses: Normal pulses.      Heart sounds: Normal heart sounds.   Pulmonary:      Effort: Pulmonary effort is normal.      Breath sounds: Normal breath sounds.   Musculoskeletal:      Cervical back: Normal range of motion and neck supple.   Feet:      Comments:      Neurological:      Mental Status: She is alert.         Procedures      Assessment/Plan:   Diagnoses and all orders for this visit:    1. Essential hypertension (Primary)  Assessment & Plan:  Discussed with patient to monitor their blood pressure and if systolic blood pressure goes above 140 or diastolic is above 90 to return to clinic.  Take medicines as directed, call for any problems, patient not having or any worrisome symptoms.        Orders:  -     Vitamin B12; Future  -     Vitamin D,25-Hydroxy; Future  -     Lipid Panel; Future  -     Comprehensive Metabolic Panel; Future  -     TSH Rfx On Abnormal To Free T4; Future  -     CBC & Differential; Future    2. Hyperlipidemia, mixed  Assessment & Plan:  HDL is 44.  LDL 93.  Recheck in 6 months.    Orders:  -     Vitamin B12; Future  -     Vitamin D,25-Hydroxy; Future  -     Lipid Panel; Future  -     Comprehensive Metabolic Panel; Future  -     TSH Rfx On Abnormal To Free T4; Future  -     CBC & Differential; Future    3. Acquired hypothyroidism  Assessment & Plan:  TSH is 7.600.  Free T4 still normal at 1.08.  Recheck in 6 months.    Orders:  -     Vitamin B12; Future  -     Vitamin D,25-Hydroxy; Future  -     Lipid Panel; Future  -     Comprehensive Metabolic Panel; Future  -     TSH Rfx On Abnormal To Free T4; Future  -     CBC & Differential; Future    4. Vitamin D deficiency  Assessment & Plan:  Vitamin D is 72.4.  Recheck in 6 months.    Orders:  -     Vitamin B12; Future  -     Vitamin D,25-Hydroxy; Future  -     Lipid Panel; Future  -     Comprehensive Metabolic Panel; Future  -     TSH Rfx On Abnormal To Free T4; Future  -     CBC & Differential; Future    5. Other persistent atrial  fibrillation  Assessment & Plan:  Patient on Xarelto.  Doing okay.    Orders:  -     Vitamin B12; Future  -     Vitamin D,25-Hydroxy; Future  -     Lipid Panel; Future  -     Comprehensive Metabolic Panel; Future  -     TSH Rfx On Abnormal To Free T4; Future  -     CBC & Differential; Future             Follow Up:   No follow-ups on file.      Jeremi Aiken MD  [facility]     Answers submitted by the patient for this visit:  Other (Submitted on 3/20/2024)  Please describe your symptoms.: Follow-up., Bloodwork  Have you had these symptoms before?: Yes  How long have you been having these symptoms?: 1-4 days  Please list any medications you are currently taking for this condition.: Pravastatin  Please describe any probable cause for these symptoms. : No symptoms.  Primary Reason for Visit (Submitted on 3/20/2024)  What is the primary reason for your visit?: Other

## 2024-04-25 DIAGNOSIS — F33.1 MODERATE EPISODE OF RECURRENT MAJOR DEPRESSIVE DISORDER: ICD-10-CM

## 2024-04-25 RX ORDER — CITALOPRAM 40 MG/1
40 TABLET ORAL DAILY
Qty: 90 TABLET | Refills: 1 | Status: SHIPPED | OUTPATIENT
Start: 2024-04-25

## 2024-04-25 NOTE — TELEPHONE ENCOUNTER
Rx Refill Note    Requested Prescriptions     Pending Prescriptions Disp Refills    citalopram (CeleXA) 40 MG tablet [Pharmacy Med Name: CITALOPRAM HBR 40 MG TABLET] 90 tablet 0     Sig: TAKE 1 TABLET BY MOUTH DAILY        Last office visit with prescribing clinician: 3/21/2024      Next office visit with prescribing clinician: 9/24/2024   Last labs:   Last refill: 01/26/2024   Pharmacy (be sure to add in Epic). Correct      Dr. Aiken patient

## 2024-06-21 RX ORDER — PRAVASTATIN SODIUM 20 MG
20 TABLET ORAL DAILY
Qty: 90 TABLET | Refills: 0 | Status: SHIPPED | OUTPATIENT
Start: 2024-06-21

## 2024-07-12 NOTE — TELEPHONE ENCOUNTER
Caller: JOEL WALLACE    Relationship: Emergency Contact    Best call back number: [phone]     Requested Prescriptions:   Requested Prescriptions      No prescriptions requested or ordered in this encounter    EPINEPHERIN 0.3 MG AUTO INJECT 2 PACK    Pharmacy where request should be sent: [facility] [id number] - [place], [place] - [address] [facility] AT [facility] [address] - [phone]  - [phone] FX     Last office visit with prescribing clinician: 3/21/2024   Last telemedicine visit with prescribing clinician: Visit date not found   Next office visit with prescribing clinician: 9/24/2024     Additional details provided by patient: PT'S CURRENT PEN EXPIRES AROUND JULY 20, 2024.    Does the patient have less than a 3 day supply:  [] Yes  [x] No    Would you like a call back once the refill request has been completed: [] Yes [x] No    If the office needs to give you a call back, can they leave a voicemail: [] Yes [x] No    Deja Alvarez Rep   07/12/24 13:39 EDT

## 2024-07-15 RX ORDER — EPINEPHRINE 0.3 MG/.3ML
0.3 INJECTION SUBCUTANEOUS ONCE
Qty: 1 EACH | Refills: 3 | Status: SHIPPED | OUTPATIENT
Start: 2024-07-15 | End: 2024-07-15

## 2024-07-15 RX ORDER — EPINEPHRINE 0.3 MG/.3ML
INJECTION SUBCUTANEOUS
COMMUNITY
End: 2024-07-15 | Stop reason: SDUPTHER

## 2024-08-13 ENCOUNTER — LAB (OUTPATIENT)
Dept: FAMILY MEDICINE CLINIC | Facility: CLINIC | Age: 84
End: 2024-08-13
Payer: MEDICARE

## 2024-08-13 DIAGNOSIS — E03.8 SECONDARY HYPOTHYROIDISM: Primary | ICD-10-CM

## 2024-08-13 DIAGNOSIS — E06.3 HASHIMOTO'S DISEASE: ICD-10-CM

## 2024-08-13 PROCEDURE — 36415 COLL VENOUS BLD VENIPUNCTURE: CPT | Performed by: FAMILY MEDICINE

## 2024-08-14 LAB
T4 FREE SERPL-MCNC: 1.84 NG/DL (ref 0.82–1.77)
TSH SERPL DL<=0.005 MIU/L-ACNC: 2.42 UIU/ML (ref 0.45–4.5)

## 2024-08-16 ENCOUNTER — TELEPHONE (OUTPATIENT)
Dept: FAMILY MEDICINE CLINIC | Facility: CLINIC | Age: 84
End: 2024-08-16
Payer: MEDICARE

## 2024-08-16 NOTE — TELEPHONE ENCOUNTER
----- Message from Jeremi Aiken sent at 8/15/2024  9:24 AM EDT -----  Abnormal test, needs an appointment, Thanks.

## 2024-08-19 ENCOUNTER — TELEPHONE (OUTPATIENT)
Dept: FAMILY MEDICINE CLINIC | Facility: CLINIC | Age: 84
End: 2024-08-19
Payer: MEDICARE

## 2024-08-19 NOTE — TELEPHONE ENCOUNTER
Name: Melissa Reddy      Relationship: Self      Best Callback Number:  [phone]       HUB PROVIDED THE RELAY MESSAGE FROM THE OFFICE      PATIENT: HAS FURTHER QUESTIONS AND WOULD LIKE A CALL BACK AT THE FOLLOWING PHONE [phone]    ADDITIONAL INFORMATION: PATIENT STATED THAT SHE DOES NOT WANT TO SEE DR CHAMBERLAIN FOR THIS APPOINTMENT.    SHE STATED SHE WANTS TO SEE FIORELLA BRENNAN WITH ENDOCRINOLOGY [facility] SINCE THAT SHE IS THE ONE WHO ORDERED THE LABS AND TREATS HER WITH THE MEDICATION.

## 2024-08-19 NOTE — TELEPHONE ENCOUNTER
HUB TO RELAY       PT HAS ABNORMAL LABS, DR CHAMBERLAIN WOULD LIKE HER TO COME IN WITHIN THE NEXT TWO WEEKS. I DID GO AHEAD AND MAKE HER AN APPT FOR 08/22/24 AT 11AM. IF THIS DOES NOT WORK PLEASE ADVISE AND RESCHEDULE.

## 2024-08-19 NOTE — TELEPHONE ENCOUNTER
Called patient informed her of Dr. Aiken message and states understanding and has no further questions at this time.

## 2024-09-17 ENCOUNTER — LAB (OUTPATIENT)
Dept: FAMILY MEDICINE CLINIC | Facility: CLINIC | Age: 84
End: 2024-09-17
Payer: MEDICARE

## 2024-09-17 DIAGNOSIS — I48.19 OTHER PERSISTENT ATRIAL FIBRILLATION: ICD-10-CM

## 2024-09-17 DIAGNOSIS — E78.2 HYPERLIPIDEMIA, MIXED: ICD-10-CM

## 2024-09-17 DIAGNOSIS — I10 ESSENTIAL HYPERTENSION: ICD-10-CM

## 2024-09-17 DIAGNOSIS — E55.9 VITAMIN D DEFICIENCY: ICD-10-CM

## 2024-09-17 DIAGNOSIS — E03.9 ACQUIRED HYPOTHYROIDISM: ICD-10-CM

## 2024-09-17 PROCEDURE — 36415 COLL VENOUS BLD VENIPUNCTURE: CPT | Performed by: FAMILY MEDICINE

## 2024-09-18 LAB
25(OH)D3+25(OH)D2 SERPL-MCNC: 72.6 NG/ML (ref 30–100)
ALBUMIN SERPL-MCNC: 4.1 G/DL (ref 3.7–4.7)
ALP SERPL-CCNC: 71 IU/L (ref 44–121)
ALT SERPL-CCNC: 22 IU/L (ref 0–32)
AST SERPL-CCNC: 27 IU/L (ref 0–40)
BASOPHILS # BLD AUTO: 0 X10E3/UL (ref 0–0.2)
BASOPHILS NFR BLD AUTO: 1 %
BILIRUB SERPL-MCNC: 0.8 MG/DL (ref 0–1.2)
BUN SERPL-MCNC: 13 MG/DL (ref 8–27)
BUN/CREAT SERPL: 16 (ref 12–28)
CALCIUM SERPL-MCNC: 9.2 MG/DL (ref 8.7–10.3)
CHLORIDE SERPL-SCNC: 101 MMOL/L (ref 96–106)
CHOLEST SERPL-MCNC: 156 MG/DL (ref 100–199)
CO2 SERPL-SCNC: 26 MMOL/L (ref 20–29)
CREAT SERPL-MCNC: 0.81 MG/DL (ref 0.57–1)
EGFRCR SERPLBLD CKD-EPI 2021: 71 ML/MIN/1.73
EOSINOPHIL # BLD AUTO: 0.1 X10E3/UL (ref 0–0.4)
EOSINOPHIL NFR BLD AUTO: 2 %
ERYTHROCYTE [DISTWIDTH] IN BLOOD BY AUTOMATED COUNT: 12.5 % (ref 11.7–15.4)
GLOBULIN SER CALC-MCNC: 2.3 G/DL (ref 1.5–4.5)
GLUCOSE SERPL-MCNC: 81 MG/DL (ref 70–99)
HCT VFR BLD AUTO: 41.6 % (ref 34–46.6)
HDLC SERPL-MCNC: 44 MG/DL
HGB BLD-MCNC: 13.3 G/DL (ref 11.1–15.9)
IMM GRANULOCYTES # BLD AUTO: 0 X10E3/UL (ref 0–0.1)
IMM GRANULOCYTES NFR BLD AUTO: 0 %
LDLC SERPL CALC-MCNC: 98 MG/DL (ref 0–99)
LYMPHOCYTES # BLD AUTO: 1.2 X10E3/UL (ref 0.7–3.1)
LYMPHOCYTES NFR BLD AUTO: 21 %
MCH RBC QN AUTO: 30.9 PG (ref 26.6–33)
MCHC RBC AUTO-ENTMCNC: 32 G/DL (ref 31.5–35.7)
MCV RBC AUTO: 97 FL (ref 79–97)
MONOCYTES # BLD AUTO: 0.6 X10E3/UL (ref 0.1–0.9)
MONOCYTES NFR BLD AUTO: 10 %
NEUTROPHILS # BLD AUTO: 3.9 X10E3/UL (ref 1.4–7)
NEUTROPHILS NFR BLD AUTO: 66 %
PLATELET # BLD AUTO: 222 X10E3/UL (ref 150–450)
POTASSIUM SERPL-SCNC: 4.1 MMOL/L (ref 3.5–5.2)
PROT SERPL-MCNC: 6.4 G/DL (ref 6–8.5)
RBC # BLD AUTO: 4.31 X10E6/UL (ref 3.77–5.28)
SODIUM SERPL-SCNC: 140 MMOL/L (ref 134–144)
TRIGL SERPL-MCNC: 72 MG/DL (ref 0–149)
TSH SERPL DL<=0.005 MIU/L-ACNC: 3.69 UIU/ML (ref 0.45–4.5)
VIT B12 SERPL-MCNC: 738 PG/ML (ref 232–1245)
VLDLC SERPL CALC-MCNC: 14 MG/DL (ref 5–40)
WBC # BLD AUTO: 5.9 X10E3/UL (ref 3.4–10.8)

## 2024-09-24 ENCOUNTER — OFFICE VISIT (OUTPATIENT)
Dept: FAMILY MEDICINE CLINIC | Facility: CLINIC | Age: 84
End: 2024-09-24
Payer: MEDICARE

## 2024-09-24 VITALS
SYSTOLIC BLOOD PRESSURE: 128 MMHG | HEIGHT: 69 IN | DIASTOLIC BLOOD PRESSURE: 84 MMHG | OXYGEN SATURATION: 98 % | HEART RATE: 68 BPM | WEIGHT: 145.3 LBS | BODY MASS INDEX: 21.52 KG/M2 | RESPIRATION RATE: 16 BRPM

## 2024-09-24 DIAGNOSIS — Z78.0 POST-MENOPAUSAL: ICD-10-CM

## 2024-09-24 DIAGNOSIS — Z00.00 MEDICARE ANNUAL WELLNESS VISIT, SUBSEQUENT: Primary | ICD-10-CM

## 2024-09-24 DIAGNOSIS — E03.9 ACQUIRED HYPOTHYROIDISM: ICD-10-CM

## 2024-09-24 DIAGNOSIS — I10 ESSENTIAL HYPERTENSION: ICD-10-CM

## 2024-09-24 DIAGNOSIS — Z00.00 PHYSICAL EXAM: ICD-10-CM

## 2024-09-24 DIAGNOSIS — E78.2 HYPERLIPIDEMIA, MIXED: ICD-10-CM

## 2024-09-24 DIAGNOSIS — E55.9 VITAMIN D DEFICIENCY: ICD-10-CM

## 2024-09-30 RX ORDER — PRAVASTATIN SODIUM 20 MG
20 TABLET ORAL DAILY
Qty: 90 TABLET | Refills: 0 | Status: SHIPPED | OUTPATIENT
Start: 2024-09-30

## 2024-10-11 DIAGNOSIS — J06.9 URI WITH COUGH AND CONGESTION: ICD-10-CM

## 2024-10-11 RX ORDER — IPRATROPIUM BROMIDE 42 UG/1
2 SPRAY, METERED NASAL 4 TIMES DAILY
Qty: 15 ML | Refills: 1 | Status: SHIPPED | OUTPATIENT
Start: 2024-10-11

## 2024-10-11 NOTE — TELEPHONE ENCOUNTER
Rx Refill Note    Requested Prescriptions     Pending Prescriptions Disp Refills    ipratropium (ATROVENT) 0.06 % nasal spray 15 mL 1     Sig: Administer 2 sprays into the nostril(s) as directed by provider 4 (Four) Times a Day.        Last office visit with prescribing clinician: 9/24/2024      Next office visit with prescribing clinician: 3/25/2025   Last labs:   Last refill: needs   Pharmacy (be sure to add in Epic). Correct      Dr. Aiken patient

## 2024-10-11 NOTE — TELEPHONE ENCOUNTER
Caller: JOEL WALLACE    Relationship: Emergency Contact    Best call back number: [phone]     Requested Prescriptions:   Requested Prescriptions     Pending Prescriptions Disp Refills    ipratropium (ATROVENT) 0.06 % nasal spray 15 mL 1        Pharmacy where request should be sent: [facility] [id number] [place], [place] - [address] [facility] AT [facility] [address] - [phone]  - [phone] FX     Last office visit with prescribing clinician: 9/24/2024   Last telemedicine visit with prescribing clinician: Visit date not found   Next office visit with prescribing clinician: 3/25/2025     Additional details provided by patient: PATIENT HAS 1 OR 2 SPRAYS LEFT OF THIS MEDICATION      Does the patient have less than a 3 day supply:  [x] Yes  [] No    Would you like a call back once the refill request has been completed: [] Yes [x] No    If the office needs to give you a call back, can they leave a voicemail: [] Yes [x] No    Deja Moran Rep   10/11/24 13:37 EDT

## 2024-11-15 ENCOUNTER — TELEPHONE (OUTPATIENT)
Dept: FAMILY MEDICINE CLINIC | Facility: CLINIC | Age: 84
End: 2024-11-15
Payer: MEDICARE

## 2024-11-15 NOTE — TELEPHONE ENCOUNTER
----- Message from Ty Wiley sent at 11/14/2024  5:18 PM EST -----  Your labs/ tests are abnormal -osteopenia-please make appointment to discuss.

## 2024-11-18 ENCOUNTER — TELEPHONE (OUTPATIENT)
Dept: FAMILY MEDICINE CLINIC | Facility: CLINIC | Age: 84
End: 2024-11-18
Payer: MEDICARE

## 2024-11-18 NOTE — TELEPHONE ENCOUNTER
Name: BarryEstelitaMelissa KIRK    Relationship: Self    Best Callback Number: [phone]    HUB PROVIDED THE RELAY MESSAGE FROM THE OFFICE   PATIENT VOICED UNDERSTANDING AND HAS NO FURTHER QUESTIONS AT THIS TIME    ADDITIONAL INFORMATION: PATIENT STATES THAT SHE WILL CALL BACK TO SCHEDULE

## 2024-11-18 NOTE — TELEPHONE ENCOUNTER
HUB TO RELAY     ----- Message from Ty Wiley sent at 11/14/2024  5:18 PM EST -----  Your labs/ tests are abnormal -osteopenia-please make appointment to discuss.

## 2024-11-19 DIAGNOSIS — F33.1 MODERATE EPISODE OF RECURRENT MAJOR DEPRESSIVE DISORDER: ICD-10-CM

## 2024-11-19 NOTE — TELEPHONE ENCOUNTER
Caller: RODRIGOJOEL    Relationship: Emergency Contact    Best call back number: [phone]    Requested Prescriptions:   Requested Prescriptions     Pending Prescriptions Disp Refills    citalopram (CeleXA) 40 MG tablet 90 tablet 1     Sig: Take 1 tablet by mouth Daily.        Pharmacy where request should be sent: [facility] [id number] [place], [place] - [address] [facility] AT [facility] [address] - [phone] [facility] [phone] FX     Last office visit with prescribing clinician: 9/24/2024   Last telemedicine visit with prescribing clinician: Visit date not found   Next office visit with prescribing clinician: 3/25/2025       Does the patient have less than a 3 day supply:  [] Yes  [x] No    Would you like a call back once the refill request has been completed: [] Yes [x] No    If the office needs to give you a call back, can they leave a voicemail: [] Yes [x] No    Deja Lopez Rep   11/19/24 13:59 EST

## 2024-11-19 NOTE — TELEPHONE ENCOUNTER
Rx Refill Note    Requested Prescriptions     Pending Prescriptions Disp Refills    citalopram (CeleXA) 40 MG tablet 90 tablet 0     Sig: Take 1 tablet by mouth Daily.        Last office visit with prescribing clinician: 9/24/2024      Next office visit with prescribing clinician: 3/25/2025   Last labs:   Last refill: 04/25/2024   Pharmacy (be sure to add in Epic). Correct

## 2024-11-20 RX ORDER — CITALOPRAM HYDROBROMIDE 40 MG/1
40 TABLET ORAL DAILY
Qty: 90 TABLET | Refills: 0 | Status: SHIPPED | OUTPATIENT
Start: 2024-11-20

## 2024-12-02 RX ORDER — ALENDRONATE SODIUM 70 MG/1
70 TABLET ORAL WEEKLY
Qty: 4 TABLET | Refills: 11 | Status: SHIPPED | OUTPATIENT
Start: 2024-12-02

## 2024-12-26 RX ORDER — PRAVASTATIN SODIUM 20 MG
20 TABLET ORAL DAILY
Qty: 90 TABLET | Refills: 1 | Status: SHIPPED | OUTPATIENT
Start: 2024-12-26

## 2024-12-30 RX ORDER — ALENDRONATE SODIUM 70 MG/1
70 TABLET ORAL WEEKLY
Qty: 4 TABLET | Refills: 11 | OUTPATIENT
Start: 2024-12-30

## 2025-02-15 DIAGNOSIS — F33.1 MODERATE EPISODE OF RECURRENT MAJOR DEPRESSIVE DISORDER: ICD-10-CM

## 2025-02-17 RX ORDER — CITALOPRAM HYDROBROMIDE 40 MG/1
40 TABLET ORAL DAILY
Qty: 90 TABLET | Refills: 0 | Status: SHIPPED | OUTPATIENT
Start: 2025-02-17

## 2025-03-18 ENCOUNTER — LAB (OUTPATIENT)
Dept: FAMILY MEDICINE CLINIC | Facility: CLINIC | Age: 85
End: 2025-03-18
Payer: MEDICARE

## 2025-03-18 DIAGNOSIS — E03.9 ACQUIRED HYPOTHYROIDISM: ICD-10-CM

## 2025-03-18 DIAGNOSIS — Z00.00 PHYSICAL EXAM: ICD-10-CM

## 2025-03-18 DIAGNOSIS — E78.2 HYPERLIPIDEMIA, MIXED: ICD-10-CM

## 2025-03-18 DIAGNOSIS — I10 ESSENTIAL HYPERTENSION: ICD-10-CM

## 2025-03-18 DIAGNOSIS — Z00.00 MEDICARE ANNUAL WELLNESS VISIT, SUBSEQUENT: ICD-10-CM

## 2025-03-18 DIAGNOSIS — Z78.0 POST-MENOPAUSAL: ICD-10-CM

## 2025-03-18 DIAGNOSIS — E55.9 VITAMIN D DEFICIENCY: ICD-10-CM

## 2025-03-19 LAB
25(OH)D3+25(OH)D2 SERPL-MCNC: 65 NG/ML (ref 30–100)
ALBUMIN SERPL-MCNC: 4.3 G/DL (ref 3.7–4.7)
ALP SERPL-CCNC: 75 IU/L (ref 44–121)
ALT SERPL-CCNC: 18 IU/L (ref 0–32)
AST SERPL-CCNC: 21 IU/L (ref 0–40)
BASOPHILS # BLD AUTO: 0 X10E3/UL (ref 0–0.2)
BASOPHILS NFR BLD AUTO: 1 %
BILIRUB SERPL-MCNC: 0.7 MG/DL (ref 0–1.2)
BUN SERPL-MCNC: 14 MG/DL (ref 8–27)
BUN/CREAT SERPL: 18 (ref 12–28)
CALCIUM SERPL-MCNC: 9.3 MG/DL (ref 8.7–10.3)
CHLORIDE SERPL-SCNC: 102 MMOL/L (ref 96–106)
CHOLEST SERPL-MCNC: 142 MG/DL (ref 100–199)
CO2 SERPL-SCNC: 27 MMOL/L (ref 20–29)
CREAT SERPL-MCNC: 0.78 MG/DL (ref 0.57–1)
EGFRCR SERPLBLD CKD-EPI 2021: 74 ML/MIN/1.73
EOSINOPHIL # BLD AUTO: 0.1 X10E3/UL (ref 0–0.4)
EOSINOPHIL NFR BLD AUTO: 1 %
ERYTHROCYTE [DISTWIDTH] IN BLOOD BY AUTOMATED COUNT: 12.5 % (ref 11.7–15.4)
GLOBULIN SER CALC-MCNC: 2.2 G/DL (ref 1.5–4.5)
GLUCOSE SERPL-MCNC: 84 MG/DL (ref 70–99)
HCT VFR BLD AUTO: 38.9 % (ref 34–46.6)
HDLC SERPL-MCNC: 45 MG/DL
HGB BLD-MCNC: 13.1 G/DL (ref 11.1–15.9)
IMM GRANULOCYTES # BLD AUTO: 0 X10E3/UL (ref 0–0.1)
IMM GRANULOCYTES NFR BLD AUTO: 0 %
LDLC SERPL CALC-MCNC: 85 MG/DL (ref 0–99)
LYMPHOCYTES # BLD AUTO: 1.4 X10E3/UL (ref 0.7–3.1)
LYMPHOCYTES NFR BLD AUTO: 22 %
MCH RBC QN AUTO: 31.2 PG (ref 26.6–33)
MCHC RBC AUTO-ENTMCNC: 33.7 G/DL (ref 31.5–35.7)
MCV RBC AUTO: 93 FL (ref 79–97)
MONOCYTES # BLD AUTO: 0.6 X10E3/UL (ref 0.1–0.9)
MONOCYTES NFR BLD AUTO: 9 %
NEUTROPHILS # BLD AUTO: 4.2 X10E3/UL (ref 1.4–7)
NEUTROPHILS NFR BLD AUTO: 67 %
PLATELET # BLD AUTO: 226 X10E3/UL (ref 150–450)
POTASSIUM SERPL-SCNC: 4.1 MMOL/L (ref 3.5–5.2)
PROT SERPL-MCNC: 6.5 G/DL (ref 6–8.5)
RBC # BLD AUTO: 4.2 X10E6/UL (ref 3.77–5.28)
SODIUM SERPL-SCNC: 143 MMOL/L (ref 134–144)
T4 FREE SERPL-MCNC: 1.85 NG/DL (ref 0.82–1.77)
TRIGL SERPL-MCNC: 59 MG/DL (ref 0–149)
TSH SERPL DL<=0.005 MIU/L-ACNC: 0.44 UIU/ML (ref 0.45–4.5)
VIT B12 SERPL-MCNC: 665 PG/ML (ref 232–1245)
VLDLC SERPL CALC-MCNC: 12 MG/DL (ref 5–40)
WBC # BLD AUTO: 6.3 X10E3/UL (ref 3.4–10.8)

## 2025-03-24 NOTE — PROGRESS NOTES
Patient Name: Melissa Reddy  : 1940   MRN: [id number]     Chief Complaint:    Chief Complaint   Patient presents with    Hypertension    Hyperlipidemia       History of Present Illness: Melissa Reddy is a 84 y.o. female who is here today for follow up on thyroid, vitamin D, cholesterol.  HPI        Review of Systems:   Review of Systems   Constitutional: Negative.    HENT: Negative.     Eyes: Negative.    Respiratory: Negative.     Cardiovascular: Negative.    Gastrointestinal: Negative.    Neurological: Negative.         Past Medical History:   Past Medical History:   Diagnosis Date    Acquired hypothyroidism     Allergic     WASP STINGS    AR (allergic rhinitis)     Atrial fibrillation     Chronic anticoagulation     Chronic back pain     Chronic kidney disease     STAGE 2    Disorder     DJD (degenerative joint disease)     INVOLVING MULTIPLE JOINTS    Essential hypertension     High risk medication use     Hypercholesterolemia     Hypertension     Hypothyroidism     Long term current use of anticoagulant     Osteoarthritis     Osteopenia     Other persistent atrial fibrillation 5/3/2022    Persistent atrial fibrillation     Recurrent major depression in full remission     Rotator cuff disorder     Tobacco use        Past Surgical History:   Past Surgical History:   Procedure Laterality Date    HYSTERECTOMY      TOTAL    ROTATOR CUFF REPAIR      TONSILLECTOMY         Family History:   Family History   Problem Relation Age of Onset    Alcohol abuse Father     Brain cancer Sister        Social History:   Social History     Socioeconomic History    Marital status:    Tobacco Use    Smoking status: Former     Current packs/day: 0.00     Average packs/day: 0.3 packs/day for 44.0 years (11.0 ttl pk-yrs)     Types: Cigarettes     Start date:      Quit date: 2000     Years since quittin.2    Smokeless tobacco: Never   Vaping Use    Vaping status: Never Used   Substance and Sexual  Activity    Alcohol use: Yes    Drug use: Never       Medications:     Current Outpatient Medications:     alendronate (FOSAMAX) 70 MG tablet, TAKE 1 TABLET BY MOUTH ONCE WEEKLY ON AN EMPTY STOMACH BEFORE BREAKFAST. REMAIN UPRIGHT FOR 30 MINUTES AND TAKE WITH 8 OUNCES OF WATER, Disp: 4 tablet, Rfl: 11    bisoprolol (ZEBeta) 5 MG tablet, , Disp: , Rfl:     citalopram (CeleXA) 40 MG tablet, TAKE 1 TABLET BY MOUTH DAILY, Disp: 90 tablet, Rfl: 0    ipratropium (ATROVENT) 0.06 % nasal spray, Administer 2 sprays into the nostril(s) as directed by provider 4 (Four) Times a Day., Disp: 15 mL, Rfl: 1    Magnesium Citrate 200 MG tablet, , Disp: , Rfl:     multivitamin with minerals tablet tablet, Take 1 tablet by mouth Daily., Disp: , Rfl:     pravastatin (PRAVACHOL) 20 MG tablet, TAKE 1 TABLET BY MOUTH DAILY, Disp: 90 tablet, Rfl: 1    Xarelto 15 MG tablet, Take 1 tablet by mouth Daily With Dinner., Disp: , Rfl:     levothyroxine (SYNTHROID, LEVOTHROID) 75 MCG tablet, Take 1 tablet by mouth Daily., Disp: 90 tablet, Rfl: 3    Allergies:   Allergies   Allergen Reactions    Wasp Venom Protein Hives and Swelling         Physical Exam:  Vital Signs: There were no vitals filed for this visit.  There is no height or weight on file to calculate BMI.     Physical Exam  Vitals and nursing note reviewed.   Constitutional:       Appearance: Normal appearance. She is normal weight.   HENT:      Head: Normocephalic and atraumatic.      Right Ear: Tympanic membrane, ear canal and external ear normal.      Left Ear: Tympanic membrane, ear canal and external ear normal.      Nose: Nose normal.      Mouth/Throat:      Mouth: Mucous membranes are dry.      Pharynx: Oropharynx is clear.   Eyes:      Extraocular Movements: Extraocular movements intact.      Conjunctiva/sclera: Conjunctivae normal.      Pupils: Pupils are equal, round, and reactive to light.   Cardiovascular:      Rate and Rhythm: Normal rate and regular rhythm.      Pulses:  Normal pulses.      Heart sounds: Normal heart sounds.   Pulmonary:      Effort: Pulmonary effort is normal.      Breath sounds: Normal breath sounds.   Musculoskeletal:      Cervical back: Normal range of motion and neck supple.   Feet:      Comments:      Neurological:      Mental Status: She is alert.         Procedures      Assessment/Plan:   Diagnoses and all orders for this visit:    1. Essential hypertension (Primary)  Assessment & Plan:  Discussed with patient to monitor their blood pressure and if systolic blood pressure goes above 140 or diastolic is above 90 to return to clinic.  Take medicines as directed, call for any problems, patient not having or any worrisome symptoms.        Orders:  -     Cancel: Measles / Mumps / Rubella Immunity; Future  -     Measles / Mumps / Rubella Immunity; Future  -     Measles / Mumps / Rubella Immunity    2. Hyperlipidemia, mixed  Assessment & Plan:  HDL 45.  LDL 85.  Triglycerides 59.  Recheck in 6 months.      3. Acquired hypothyroidism  Assessment & Plan:  TSH 0.444.  Free T41.85.  We will reduce her Synthroid down to 75 mcg      4. Vitamin D deficiency  Assessment & Plan:  Vitamin D 65.0.  Recheck in 6 months.      Other orders  -     levothyroxine (SYNTHROID, LEVOTHROID) 75 MCG tablet; Take 1 tablet by mouth Daily.  Dispense: 90 tablet; Refill: 3             Follow Up:   Return in about 6 weeks (around 5/6/2025) for Recheck, Bloodwork 1 week prior to next appointment.      Jeremi Aiken MD  [facility]

## 2025-03-25 ENCOUNTER — OFFICE VISIT (OUTPATIENT)
Dept: FAMILY MEDICINE CLINIC | Facility: CLINIC | Age: 85
End: 2025-03-25
Payer: MEDICARE

## 2025-03-25 DIAGNOSIS — I10 ESSENTIAL HYPERTENSION: Primary | ICD-10-CM

## 2025-03-25 DIAGNOSIS — E03.9 ACQUIRED HYPOTHYROIDISM: ICD-10-CM

## 2025-03-25 DIAGNOSIS — E55.9 VITAMIN D DEFICIENCY: ICD-10-CM

## 2025-03-25 DIAGNOSIS — E78.2 HYPERLIPIDEMIA, MIXED: ICD-10-CM

## 2025-03-25 RX ORDER — LEVOTHYROXINE SODIUM 75 UG/1
75 TABLET ORAL DAILY
Qty: 90 TABLET | Refills: 3 | Status: SHIPPED | OUTPATIENT
Start: 2025-03-25

## 2025-03-26 LAB
MEV IGG SER IA-ACNC: >300 AU/ML
MUV IGG SER IA-ACNC: 56 AU/ML
RUBV IGG SERPL IA-ACNC: 25.2 INDEX

## 2025-04-08 DIAGNOSIS — F33.1 MODERATE EPISODE OF RECURRENT MAJOR DEPRESSIVE DISORDER: ICD-10-CM

## 2025-04-09 RX ORDER — CITALOPRAM HYDROBROMIDE 40 MG/1
40 TABLET ORAL DAILY
Qty: 90 TABLET | Refills: 0 | Status: SHIPPED | OUTPATIENT
Start: 2025-04-09

## 2025-04-15 DIAGNOSIS — J06.9 URI WITH COUGH AND CONGESTION: ICD-10-CM

## 2025-04-15 NOTE — TELEPHONE ENCOUNTER
Caller: RODRIGOJOEL    Relationship: Emergency Contact    Best call back number: [phone]    Requested Prescriptions:   Requested Prescriptions     Pending Prescriptions Disp Refills    ipratropium (ATROVENT) 0.06 % nasal spray 15 mL 1     Sig: Administer 2 sprays into the nostril(s) as directed by provider 4 (Four) Times a Day.        Pharmacy where request should be sent: [facility] [id number] [facility] [address] [facility] AT [facility] [address] - [phone] [facility] [phone] FX     Last office visit with prescribing clinician: 3/25/2025   Last telemedicine visit with prescribing clinician: Visit date not found   Next office visit with prescribing clinician: 5/1/2025     Additional details provided by patient:     Does the patient have less than a 3 day supply:  [x] Yes  [] No    Would you like a call back once the refill request has been completed: [] Yes [x] No    If the office needs to give you a call back, can they leave a voicemail: [] Yes [x] No    Deja Cornell Rep   04/15/25 14:50 EDT

## 2025-04-16 RX ORDER — IPRATROPIUM BROMIDE 42 UG/1
2 SPRAY, METERED NASAL 4 TIMES DAILY
Qty: 15 ML | Refills: 1 | Status: SHIPPED | OUTPATIENT
Start: 2025-04-16

## 2025-04-25 ENCOUNTER — LAB (OUTPATIENT)
Dept: FAMILY MEDICINE CLINIC | Facility: CLINIC | Age: 85
End: 2025-04-25
Payer: MEDICARE

## 2025-04-25 DIAGNOSIS — E78.2 HYPERLIPIDEMIA, MIXED: ICD-10-CM

## 2025-04-25 DIAGNOSIS — Z79.899 HIGH RISK MEDICATION USE: ICD-10-CM

## 2025-04-25 DIAGNOSIS — I10 ESSENTIAL HYPERTENSION: Primary | ICD-10-CM

## 2025-04-25 DIAGNOSIS — E03.9 ACQUIRED HYPOTHYROIDISM: ICD-10-CM

## 2025-04-26 LAB
ALBUMIN SERPL-MCNC: 4.1 G/DL (ref 3.7–4.7)
ALP SERPL-CCNC: 77 IU/L (ref 44–121)
ALT SERPL-CCNC: 19 IU/L (ref 0–32)
AST SERPL-CCNC: 28 IU/L (ref 0–40)
BASOPHILS # BLD AUTO: 0 X10E3/UL (ref 0–0.2)
BASOPHILS NFR BLD AUTO: 1 %
BILIRUB SERPL-MCNC: 0.5 MG/DL (ref 0–1.2)
BUN SERPL-MCNC: 17 MG/DL (ref 8–27)
BUN/CREAT SERPL: 22 (ref 12–28)
CALCIUM SERPL-MCNC: 9.4 MG/DL (ref 8.7–10.3)
CHLORIDE SERPL-SCNC: 101 MMOL/L (ref 96–106)
CHOLEST SERPL-MCNC: 150 MG/DL (ref 100–199)
CK SERPL-CCNC: 77 U/L (ref 26–161)
CO2 SERPL-SCNC: 24 MMOL/L (ref 20–29)
CREAT SERPL-MCNC: 0.76 MG/DL (ref 0.57–1)
EGFRCR SERPLBLD CKD-EPI 2021: 76 ML/MIN/1.73
EOSINOPHIL # BLD AUTO: 0.2 X10E3/UL (ref 0–0.4)
EOSINOPHIL NFR BLD AUTO: 2 %
ERYTHROCYTE [DISTWIDTH] IN BLOOD BY AUTOMATED COUNT: 12.4 % (ref 11.7–15.4)
GLOBULIN SER CALC-MCNC: 2.1 G/DL (ref 1.5–4.5)
GLUCOSE SERPL-MCNC: 85 MG/DL (ref 70–99)
HBA1C MFR BLD: 5.7 % (ref 4.8–5.6)
HCT VFR BLD AUTO: 38.6 % (ref 34–46.6)
HDLC SERPL-MCNC: 41 MG/DL
HGB BLD-MCNC: 12.7 G/DL (ref 11.1–15.9)
IMM GRANULOCYTES # BLD AUTO: 0 X10E3/UL (ref 0–0.1)
IMM GRANULOCYTES NFR BLD AUTO: 0 %
LDLC SERPL CALC-MCNC: 89 MG/DL (ref 0–99)
LYMPHOCYTES # BLD AUTO: 1.4 X10E3/UL (ref 0.7–3.1)
LYMPHOCYTES NFR BLD AUTO: 23 %
MCH RBC QN AUTO: 30.7 PG (ref 26.6–33)
MCHC RBC AUTO-ENTMCNC: 32.9 G/DL (ref 31.5–35.7)
MCV RBC AUTO: 93 FL (ref 79–97)
MONOCYTES # BLD AUTO: 0.6 X10E3/UL (ref 0.1–0.9)
MONOCYTES NFR BLD AUTO: 9 %
NEUTROPHILS # BLD AUTO: 4 X10E3/UL (ref 1.4–7)
NEUTROPHILS NFR BLD AUTO: 65 %
PLATELET # BLD AUTO: 245 X10E3/UL (ref 150–450)
POTASSIUM SERPL-SCNC: 3.9 MMOL/L (ref 3.5–5.2)
PROT SERPL-MCNC: 6.2 G/DL (ref 6–8.5)
RBC # BLD AUTO: 4.14 X10E6/UL (ref 3.77–5.28)
SODIUM SERPL-SCNC: 140 MMOL/L (ref 134–144)
TRIGL SERPL-MCNC: 107 MG/DL (ref 0–149)
TSH SERPL DL<=0.005 MIU/L-ACNC: 1.16 UIU/ML (ref 0.45–4.5)
VLDLC SERPL CALC-MCNC: 20 MG/DL (ref 5–40)
WBC # BLD AUTO: 6.2 X10E3/UL (ref 3.4–10.8)

## 2025-04-30 ENCOUNTER — TELEPHONE (OUTPATIENT)
Dept: FAMILY MEDICINE CLINIC | Facility: CLINIC | Age: 85
End: 2025-04-30
Payer: MEDICARE

## 2025-04-30 NOTE — TELEPHONE ENCOUNTER
HUB TO RELAY    PT DOES NOT HAVE ANYONE ON [facility] VERBAL, WE ARE NOT ABLE TO SPEAK TO ANYONE BUT THE PT UNTIL  VERBAL IS UPDATED. PT MAY COMPLETE ONE ON MY CHART OR COME IN OFFICE TO SIGN AND UPDATE WITH WHO WE ARE ABLE TO SPEAK TOO.

## 2025-04-30 NOTE — TELEPHONE ENCOUNTER
Caller: JOEL WALLACE    Relationship: Emergency Contact    Best call back number: [phone]  Who are you requesting to speak with (clinical staff, provider,  specific staff member): NURSE    What was the call regarding: REGARDING INFORMATION NEEDED FOR  PT APPT ON 5/1

## 2025-04-30 NOTE — PROGRESS NOTES
Patient Name: Melissa Reddy  : 1940   MRN: [id number]     Chief Complaint:    Chief Complaint   Patient presents with    Hypertension    Hyperlipidemia    Fall     Onset 5 weeks ago        History of Present Illness: Mleissa Reddy is a 84 y.o. female who is here today for follow up on thyroid, blood sugar, and blood pressure. And afib  HPI        Review of Systems:   Review of Systems   Constitutional: Negative.  Negative for chills, diaphoresis, fatigue and fever.   HENT: Negative.  Negative for congestion and sore throat.    Eyes: Negative.    Respiratory: Negative.  Negative for cough.    Cardiovascular: Negative.  Negative for chest pain.   Gastrointestinal: Negative.  Negative for abdominal pain, nausea and vomiting.   Genitourinary:  Negative for dysuria.   Musculoskeletal:  Negative for myalgias and neck pain.   Skin:  Negative for rash.   Neurological: Negative.  Negative for weakness, numbness and headaches.        Past Medical History:   Past Medical History:   Diagnosis Date    Acquired hypothyroidism     Allergic     WASP STINGS    AR (allergic rhinitis)     Atrial fibrillation     Chronic anticoagulation     Chronic back pain     Chronic kidney disease     STAGE 2    Disorder     DJD (degenerative joint disease)     INVOLVING MULTIPLE JOINTS    Essential hypertension     High risk medication use     Hypercholesterolemia     Hypertension     Hypothyroidism     Long term current use of anticoagulant     Osteoarthritis     Osteopenia     Other persistent atrial fibrillation 5/3/2022    Persistent atrial fibrillation     Recurrent major depression in full remission     Rotator cuff disorder     Tobacco use        Past Surgical History:   Past Surgical History:   Procedure Laterality Date    HYSTERECTOMY      TOTAL    ROTATOR CUFF REPAIR      TONSILLECTOMY         Family History:   Family History   Problem Relation Age of Onset    Alcohol abuse Father     Brain cancer Sister   "      Social History:   Social History     Socioeconomic History    Marital status:    Tobacco Use    Smoking status: Former     Current packs/day: 0.00     Average packs/day: 0.3 packs/day for 44.0 years (11.0 ttl pk-yrs)     Types: Cigarettes     Start date:      Quit date:      Years since quittin.3    Smokeless tobacco: Never   Vaping Use    Vaping status: Never Used   Substance and Sexual Activity    Alcohol use: Yes    Drug use: Never       Medications:     Current Outpatient Medications:     alendronate (FOSAMAX) 70 MG tablet, TAKE 1 TABLET BY MOUTH ONCE WEEKLY ON AN EMPTY STOMACH BEFORE BREAKFAST. REMAIN UPRIGHT FOR 30 MINUTES AND TAKE WITH 8 OUNCES OF WATER, Disp: 4 tablet, Rfl: 11    bisoprolol (ZEBeta) 5 MG tablet, , Disp: , Rfl:     citalopram (CeleXA) 40 MG tablet, TAKE 1 TABLET BY MOUTH DAILY, Disp: 90 tablet, Rfl: 0    ipratropium (ATROVENT) 0.06 % nasal spray, Administer 2 sprays into the nostril(s) as directed by provider 4 (Four) Times a Day., Disp: 15 mL, Rfl: 1    levothyroxine (SYNTHROID, LEVOTHROID) 75 MCG tablet, Take 1 tablet by mouth Daily., Disp: 90 tablet, Rfl: 3    Magnesium Citrate 200 MG tablet, , Disp: , Rfl:     multivitamin with minerals tablet tablet, Take 1 tablet by mouth Daily., Disp: , Rfl:     pravastatin (PRAVACHOL) 20 MG tablet, TAKE 1 TABLET BY MOUTH DAILY, Disp: 90 tablet, Rfl: 1    Xarelto 15 MG tablet, Take 1 tablet by mouth Daily With Dinner., Disp: , Rfl:     Allergies:   Allergies   Allergen Reactions    Wasp Venom Protein Hives and Swelling         Physical Exam:  Vital Signs:   Vitals:    25 1410   BP: 128/88   BP Location: Left arm   Patient Position: Sitting   Cuff Size: Adult   Pulse: 76   SpO2: 98%   Weight: 63.3 kg (139 lb 9.6 oz)   Height: 175.3 cm (69.02\")   PainSc: 0-No pain     Body mass index is 20.61 kg/m².     Physical Exam  Vitals and nursing note reviewed.   Constitutional:       Appearance: Normal appearance. She is normal " weight.   HENT:      Head: Normocephalic and atraumatic.      Right Ear: Tympanic membrane, ear canal and external ear normal.      Left Ear: Tympanic membrane, ear canal and external ear normal.      Nose: Nose normal.      Mouth/Throat:      Mouth: Mucous membranes are dry.      Pharynx: Oropharynx is clear.   Eyes:      Extraocular Movements: Extraocular movements intact.      Conjunctiva/sclera: Conjunctivae normal.      Pupils: Pupils are equal, round, and reactive to light.   Cardiovascular:      Rate and Rhythm: Normal rate and regular rhythm.      Pulses: Normal pulses.      Heart sounds: Normal heart sounds.   Pulmonary:      Effort: Pulmonary effort is normal.      Breath sounds: Normal breath sounds.   Musculoskeletal:      Cervical back: Normal range of motion and neck supple.   Feet:      Comments:      Neurological:      Mental Status: She is alert.         Procedures      Assessment/Plan:   Diagnoses and all orders for this visit:    1. Acquired hypothyroidism (Primary)  Assessment & Plan:  TSH is better at 1.160.  Recheck in 6 months.    Orders:  -     Hemoglobin A1c; Future  -     Vitamin B12; Future  -     Vitamin D,25-Hydroxy; Future  -     Lipid Panel; Future  -     Comprehensive Metabolic Panel; Future  -     TSH Rfx On Abnormal To Free T4; Future  -     CBC & Differential; Future    2. Essential hypertension  Assessment & Plan:  Discussed with patient to monitor their blood pressure and if systolic blood pressure goes above 140 or diastolic is above 90 to return to clinic.  Take medicines as directed, call for any problems, patient not having or any worrisome symptoms.        Orders:  -     Hemoglobin A1c; Future  -     Vitamin B12; Future  -     Vitamin D,25-Hydroxy; Future  -     Lipid Panel; Future  -     Comprehensive Metabolic Panel; Future  -     TSH Rfx On Abnormal To Free T4; Future  -     CBC & Differential; Future    3. Vitamin D deficiency  Assessment & Plan:  Recheck in 6  months    Orders:  -     Hemoglobin A1c; Future  -     Vitamin B12; Future  -     Vitamin D,25-Hydroxy; Future  -     Lipid Panel; Future  -     Comprehensive Metabolic Panel; Future  -     TSH Rfx On Abnormal To Free T4; Future  -     CBC & Differential; Future    4. Unsteady gait  Assessment & Plan:  I am going to refer to [facility].    Orders:  -     Hemoglobin A1c; Future  -     Vitamin B12; Future  -     Vitamin D,25-Hydroxy; Future  -     Lipid Panel; Future  -     Comprehensive Metabolic Panel; Future  -     TSH Rfx On Abnormal To Free T4; Future  -     CBC & Differential; Future    5. Other persistent atrial fibrillation  Assessment & Plan:  Patient is currently in A-fib.  She has a history of longstanding A-fib.  Her heart rate is going up occasionally.  This is symptomatic.  She is on bisoprolol and I told her that she needs to call Dr. Wren to see if he wants to change this over to metoprolol or Cardizem for rate control.  She will make appointment to see him I told her if she cannot make the appointment at all make appointment for her she will call me back.    Orders:  -     Ambulatory Referral to Physical Therapy for Evaluation & Treatment  -     Hemoglobin A1c; Future  -     Vitamin B12; Future  -     Vitamin D,25-Hydroxy; Future  -     Lipid Panel; Future  -     Comprehensive Metabolic Panel; Future  -     TSH Rfx On Abnormal To Free T4; Future  -     CBC & Differential; Future    6. Hyperglycemia  Assessment & Plan:  Patient will follow    Orders:  -     Hemoglobin A1c; Future             Follow Up:   Return in about 6 months (around 11/1/2025) for Annual physical, Bloodwork 1 week prior to next appointment.      Jeremi Aiken MD  [facility]

## 2025-05-01 ENCOUNTER — OFFICE VISIT (OUTPATIENT)
Dept: FAMILY MEDICINE CLINIC | Facility: CLINIC | Age: 85
End: 2025-05-01
Payer: MEDICARE

## 2025-05-01 VITALS
HEIGHT: 69 IN | OXYGEN SATURATION: 98 % | BODY MASS INDEX: 20.68 KG/M2 | HEART RATE: 76 BPM | DIASTOLIC BLOOD PRESSURE: 88 MMHG | WEIGHT: 139.6 LBS | SYSTOLIC BLOOD PRESSURE: 128 MMHG

## 2025-05-01 DIAGNOSIS — I48.19 OTHER PERSISTENT ATRIAL FIBRILLATION: ICD-10-CM

## 2025-05-01 DIAGNOSIS — E03.9 ACQUIRED HYPOTHYROIDISM: Primary | ICD-10-CM

## 2025-05-01 DIAGNOSIS — R26.81 UNSTEADY GAIT: ICD-10-CM

## 2025-05-01 DIAGNOSIS — R73.9 HYPERGLYCEMIA: ICD-10-CM

## 2025-05-01 DIAGNOSIS — I10 ESSENTIAL HYPERTENSION: ICD-10-CM

## 2025-05-01 DIAGNOSIS — E55.9 VITAMIN D DEFICIENCY: ICD-10-CM

## 2025-05-01 NOTE — ASSESSMENT & PLAN NOTE
Discussed with patient to monitor their blood pressure and if systolic blood pressure goes above 140 or diastolic is above 90 to return to clinic.  Take medicines as directed, call for any problems, patient not having or any worrisome symptoms.      
I am going to refer to [facility].  
Patient is currently in A-fib.  She has a history of longstanding A-fib.  Her heart rate is going up occasionally.  This is symptomatic.  She is on bisoprolol and I told her that she needs to call Dr. Wren to see if he wants to change this over to metoprolol or Cardizem for rate control.  She will make appointment to see him I told her if she cannot make the appointment at all make appointment for her she will call me back.  
Patient will follow  
Recheck in 6 months  
TSH is better at 1.160.  Recheck in 6 months.  
9 (severe pain)

## 2025-07-07 RX ORDER — PRAVASTATIN SODIUM 20 MG
20 TABLET ORAL DAILY
Qty: 90 TABLET | Refills: 1 | Status: SHIPPED | OUTPATIENT
Start: 2025-07-07